# Patient Record
Sex: FEMALE | Race: OTHER | Employment: FULL TIME | URBAN - METROPOLITAN AREA
[De-identification: names, ages, dates, MRNs, and addresses within clinical notes are randomized per-mention and may not be internally consistent; named-entity substitution may affect disease eponyms.]

---

## 2019-03-13 ENCOUNTER — OFFICE VISIT (OUTPATIENT)
Dept: OBGYN CLINIC | Age: 39
End: 2019-03-13

## 2019-03-13 VITALS — SYSTOLIC BLOOD PRESSURE: 120 MMHG | HEART RATE: 77 BPM | WEIGHT: 195.4 LBS | DIASTOLIC BLOOD PRESSURE: 77 MMHG

## 2019-03-13 DIAGNOSIS — O20.0 THREATENED ABORTION: Primary | ICD-10-CM

## 2019-03-13 NOTE — PROGRESS NOTES
Threatened AB note    Key Montgomery is a No obstetric history on file. ,  41 y.o. female OTHER LMP 2018   making her 12 weeks pregnant. She has not had prenatal care. She presents with no spotting and cramping. She has not passed tissue. She had a positive pregnancy test 19. She has had a recent pelvic ultrasound that showed a single nonviable with no cardiac rhythm at 7 weeks. Her past medical history is not significant for risk factors for ectopic pregnancy. She has not had pelvic pain. The patient specifically denies right or left pelvic pain. She does have a history of a spontaneous . ? At 5 months at [facility].  She has not had a recent injury or trauma. Additional complaints: none. History reviewed. No pertinent past medical history. History reviewed. No pertinent surgical history. Social History     Occupational History    Not on file   Tobacco Use    Smoking status: Never Smoker    Smokeless tobacco: Never Used   Substance and Sexual Activity    Alcohol use: No     Frequency: Never    Drug use: No    Sexual activity: Yes     Partners: Male     Birth control/protection: None     History reviewed. No pertinent family history.     No Known Allergies  Prior to Admission medications    Not on File        Review of Systems: History obtained from the patient  Constitutional: negative for weight loss, fever, night sweats  Breast: negative for breast lumps, nipple discharge, galactorrhea  GI: negative for change in bowel habits, abdominal pain, black or bloody stools  : negative for frequency, dysuria, hematuria, vaginal discharge  MSK: negative for back pain, joint pain, muscle pain  Skin: negative for itching, rash, hives  Psych: negative for anxiety, depression, change in mood      Objective:  Visit Vitals  /77 (BP 1 Location: Left arm, BP Patient Position: Sitting)   Pulse 77   Wt 195 lb 6.4 oz (88.6 kg)       Physical Exam:   PHYSICAL EXAMINATION    Constitutional  · Appearance: well-nourished, well developed, alert, in no acute distress    ·     Neurologic/Psychiatric  · Mental Status:  · Orientation: grossly oriented to person, place and time  · Mood and Affect: mood normal, affect appropriate    Assessment:   Missed  AB at 7 weeks. Plan:   Offered observation, Cytotec, D&E. She will RTO in 2 weeks for f/u US. Given bleeding precautions. Will try to get records from [facility] about her last pregnancy loss.

## 2019-03-27 ENCOUNTER — OFFICE VISIT (OUTPATIENT)
Dept: OBGYN CLINIC | Age: 39
End: 2019-03-27

## 2019-03-27 DIAGNOSIS — O02.1 MISSED ABORTION: Primary | ICD-10-CM

## 2019-03-27 NOTE — PROGRESS NOTES
Threatened AB note    Read Michael is a ,  41 y.o. female OTHER whose  14 weeks pregnant , who presents for a folow up ultrasound to determine viability. Recent Tests:  She had a positive   pregnancy test weeks ago . She had a pelvic ultrasound today that showed   TA ULTRASOUND PERFORMED  A SINGLE NONVIABLE 6W4D IUP IS SEEN WITH NO CARDIAC RHYTHM. THE GS IS IRREGULAR. NO INTERVAL GROWTH IS SEEN FROM PRIOR US. GESTATIONAL AGE BASED ON TODAYS ULTRASOUND. NO YOLK SAC IS SEEN. RIGHT OVARY APPEARS WITHIN NORMAL LIMITS. LEFT OVARY APPEARS WITHIN NORMAL LIMITS. NO FREE FLUID SEEN IN THE CDS. Silvino Cronin She has not had prenatal care. Sheis not having pelvic pain. She (has not passed tissue. The patient denies lateralizing pelvic pain. She does have a history of a spontaneous . Her past medical history is significant for  has no past medical history on file. .   She has not had a recent injury or trauma. Additional complaints: none. No past medical history on file. No past surgical history on file. Social History     Occupational History    Not on file   Tobacco Use    Smoking status: Never Smoker    Smokeless tobacco: Never Used   Substance and Sexual Activity    Alcohol use: No     Frequency: Never    Drug use: No    Sexual activity: Yes     Partners: Male     Birth control/protection: None     No family history on file.     No Known Allergies  Prior to Admission medications    Not on File        Review of Systems: History obtained from the patient  Constitutional: negative for weight loss, fever, night sweats  Breast: negative for breast lumps, nipple discharge, galactorrhea  GI: negative for change in bowel habits, abdominal pain, black or bloody stools  : negative for frequency, dysuria, hematuria, vaginal discharge  MSK: negative for back pain, joint pain, muscle pain  Skin: negative for itching, rash, hives  Psych: negative for anxiety, depression, change in mood      Objective: There were no vitals taken for this visit. Physical Exam:   PHYSICAL EXAMINATION    Constitutional  · Appearance: well-nourished, well developed, alert, in no acute distress    Gastrointestinal  · Abdominal Examination: abdomen non-tender to palpation, normal bowel sounds, no masses present  · Liver and spleen: no hepatomegaly present, spleen not palpable  · Hernias: no hernias identified    Genitourinary  · External Genitalia: normal appearance for age, no discharge present, no tenderness present, no inflammatory lesions present, no masses present, no atrophy present  · Vagina: normal vaginal vault without central or paravaginal defects, no discharge present, no inflammatory lesions present, no masses present  · Bladder: non-tender to palpation  · Urethra: appears normal  · Cervix: normal   · Uterus: enlarged, soft, mildly tender with normal shape and consistency  · Adnexa: no adnexal tenderness present, no adnexal masses present  · Perineum: perineum within normal limits, no evidence of trauma, no rashes or skin lesions present  · Anus: anus within normal limits, no hemorrhoids present  · Inguinal Lymph Nodes: no lymphadenopathy present    Skin  · General Inspection: no rash, no lesions identified    Neurologic/Psychiatric  · Mental Status:  · Orientation: grossly oriented to person, place and time  · Mood and Affect: mood normal, affect appropriate    Assessment:   Missed     Plan:   Given options of observation, cytotec and D&C. She desires D&C. Aware of risks and alternatives. Her questions were answered.

## 2019-03-28 ENCOUNTER — ANESTHESIA EVENT (OUTPATIENT)
Dept: SURGERY | Age: 39
End: 2019-03-28
Payer: SELF-PAY

## 2019-03-28 RX ORDER — ONDANSETRON 2 MG/ML
4 INJECTION INTRAMUSCULAR; INTRAVENOUS AS NEEDED
Status: CANCELLED | OUTPATIENT
Start: 2019-03-28

## 2019-03-28 RX ORDER — SODIUM CHLORIDE, SODIUM LACTATE, POTASSIUM CHLORIDE, CALCIUM CHLORIDE 600; 310; 30; 20 MG/100ML; MG/100ML; MG/100ML; MG/100ML
125 INJECTION, SOLUTION INTRAVENOUS CONTINUOUS
Status: CANCELLED | OUTPATIENT
Start: 2019-03-28

## 2019-03-28 RX ORDER — HYDROMORPHONE HYDROCHLORIDE 1 MG/ML
.5-1 INJECTION, SOLUTION INTRAMUSCULAR; INTRAVENOUS; SUBCUTANEOUS
Status: CANCELLED | OUTPATIENT
Start: 2019-03-28

## 2019-03-28 NOTE — H&P
Seymour Acevedo [facility] 81 HISTORY AND PHYSICAL Name:  Yeni Tariq 
MR#:  [id number] :  1980 ACCOUNT #:  [de-identified] ADMIT DATE:  2019 She is to be admitted on  for outpatient surgery. PREOPERATIVE DIAGNOSIS:  Missed . HISTORY OF PRESENT ILLNESS:  The patient is a 51-year-old female, G5, P2, A2, who has been seen in the office over the past 2 weeks with serial ultrasounds. Ultrasounds have shown no growth. She should be about 14 weeks by her dates. Ultrasound shows a 6-1/2-week intrauterine pregnancy with no fetal flicker. This is consistent with a similar ultrasound two weeks ago. There has been no change in the ultrasound in the past two weeks. She has had no bleeding or cramping. She had a spontaneous  about six months ago and had a D and C at that time. She elected to proceed with D and C this time. She was given options of observation versus medical treatment. PAST MEDICAL HISTORY:  Surgeries:  D and C x2. She has had some sort of back treatment as well. Illnesses:  None. ALLERGIES:  NONE. CURRENT MEDICATIONS:  Multivitamins. SOCIAL HISTORY:  Negative for tobacco or alcohol. PHYSICAL EXAMINATION: 
VITAL SIGNS:  Blood pressure is 120/77, weight is 195, pulse is 77. GENERAL:  The patient appears well developed, well nourished. She is in no acute distress. CHEST:  Clear. HEART:  Regular rate and rhythm. ABDOMEN:  Soft and nontender. PELVIC:  External genitalia is normal.  Vagina is normal.  Cervix is normal.  Uterus is about 6 to 8 weeks' size. Adnexae are unremarkable. IMPRESSION:  Missed  at 6 to 7 weeks. PLAN:  Admission as an outpatient for dilation and evacuation. Again, the patient was given options of observation versus medical treatment and she desires D and C. She was advised of the risk of D and C and the alternatives and all her questions were answered.  
 
 
Nora Phan MD 
 
 
 [id number]/ 
D:  03/28/2019 8:12 
T:  03/28/2019 8:27 JOB #:  [id number]

## 2019-03-28 NOTE — PERIOP NOTES
Received a return e-mail from [facility] [address] [place] [address] to be  for the day of surgery.   DOS: 3/29/2019

## 2019-03-29 ENCOUNTER — HOSPITAL ENCOUNTER (OUTPATIENT)
Age: 39
Setting detail: OUTPATIENT SURGERY
Discharge: HOME | End: 2019-03-29
Attending: OBSTETRICS & GYNECOLOGY | Admitting: OBSTETRICS & GYNECOLOGY
Payer: SELF-PAY

## 2019-03-29 ENCOUNTER — ANESTHESIA (OUTPATIENT)
Dept: SURGERY | Age: 39
End: 2019-03-29
Payer: SELF-PAY

## 2019-03-29 VITALS
SYSTOLIC BLOOD PRESSURE: 116 MMHG | DIASTOLIC BLOOD PRESSURE: 71 MMHG | TEMPERATURE: 98.2 F | RESPIRATION RATE: 17 BRPM | HEART RATE: 85 BPM | BODY MASS INDEX: 35.94 KG/M2 | WEIGHT: 195.33 LBS | HEIGHT: 62 IN | OXYGEN SATURATION: 98 %

## 2019-03-29 DIAGNOSIS — G89.18 POST-OPERATIVE PAIN: Primary | ICD-10-CM

## 2019-03-29 DIAGNOSIS — O02.1 MISSED ABORTION: ICD-10-CM

## 2019-03-29 LAB
ABO + RH BLD: NORMAL
BLOOD BANK CMNT PATIENT-IMP: NORMAL
ERYTHROCYTE [DISTWIDTH] IN BLOOD BY AUTOMATED COUNT: 12 % (ref 11.5–14.5)
HCT VFR BLD AUTO: 38.7 % (ref 35–47)
HGB BLD-MCNC: 13 G/DL (ref 11.5–16)
MCH RBC QN AUTO: 30.2 PG (ref 26–34)
MCHC RBC AUTO-ENTMCNC: 33.6 G/DL (ref 30–36.5)
MCV RBC AUTO: 90 FL (ref 80–99)
NRBC # BLD: 0 K/UL (ref 0–0.01)
NRBC BLD-RTO: 0 PER 100 WBC
PLATELET # BLD AUTO: 215 K/UL (ref 150–400)
PMV BLD AUTO: 9.9 FL (ref 8.9–12.9)
RBC # BLD AUTO: 4.3 M/UL (ref 3.8–5.2)
WBC # BLD AUTO: 8 K/UL (ref 3.6–11)

## 2019-03-29 PROCEDURE — 77030018836 HC SOL IRR NACL ICUM -A: Performed by: OBSTETRICS & GYNECOLOGY

## 2019-03-29 PROCEDURE — 74011250636 HC RX REV CODE- 250/636: Performed by: ANESTHESIOLOGY

## 2019-03-29 PROCEDURE — 74011250637 HC RX REV CODE- 250/637: Performed by: OBSTETRICS & GYNECOLOGY

## 2019-03-29 PROCEDURE — 76060000032 HC ANESTHESIA 0.5 TO 1 HR: Performed by: OBSTETRICS & GYNECOLOGY

## 2019-03-29 PROCEDURE — 74011250636 HC RX REV CODE- 250/636: Performed by: OBSTETRICS & GYNECOLOGY

## 2019-03-29 PROCEDURE — 76210000006 HC OR PH I REC 0.5 TO 1 HR: Performed by: OBSTETRICS & GYNECOLOGY

## 2019-03-29 PROCEDURE — 36415 COLL VENOUS BLD VENIPUNCTURE: CPT

## 2019-03-29 PROCEDURE — 74011250636 HC RX REV CODE- 250/636

## 2019-03-29 PROCEDURE — 77030020143 HC AIRWY LARYN INTUB CGAS -A: Performed by: ANESTHESIOLOGY

## 2019-03-29 PROCEDURE — 76210000021 HC REC RM PH II 0.5 TO 1 HR: Performed by: OBSTETRICS & GYNECOLOGY

## 2019-03-29 PROCEDURE — 74011000250 HC RX REV CODE- 250: Performed by: OBSTETRICS & GYNECOLOGY

## 2019-03-29 PROCEDURE — 76010000138 HC OR TIME 0.5 TO 1 HR: Performed by: OBSTETRICS & GYNECOLOGY

## 2019-03-29 PROCEDURE — 77030005537 HC CATH URETH BARD -A: Performed by: OBSTETRICS & GYNECOLOGY

## 2019-03-29 PROCEDURE — 77030008578 HC TBNG UTER SUC BUSS -A: Performed by: OBSTETRICS & GYNECOLOGY

## 2019-03-29 PROCEDURE — 77030020782 HC GWN BAIR PAWS FLX 3M -B

## 2019-03-29 PROCEDURE — 88305 TISSUE EXAM BY PATHOLOGIST: CPT

## 2019-03-29 PROCEDURE — 85027 COMPLETE CBC AUTOMATED: CPT

## 2019-03-29 PROCEDURE — 77030032490 HC SLV COMPR SCD KNE COVD -B

## 2019-03-29 PROCEDURE — 86900 BLOOD TYPING SEROLOGIC ABO: CPT

## 2019-03-29 RX ORDER — SODIUM CHLORIDE 0.9 % (FLUSH) 0.9 %
5-40 SYRINGE (ML) INJECTION EVERY 8 HOURS
Status: DISCONTINUED | OUTPATIENT
Start: 2019-03-29 | End: 2019-03-29 | Stop reason: HOSPADM

## 2019-03-29 RX ORDER — LIDOCAINE HYDROCHLORIDE 10 MG/ML
0.1 INJECTION, SOLUTION EPIDURAL; INFILTRATION; INTRACAUDAL; PERINEURAL AS NEEDED
Status: DISCONTINUED | OUTPATIENT
Start: 2019-03-29 | End: 2019-03-29 | Stop reason: HOSPADM

## 2019-03-29 RX ORDER — SODIUM CHLORIDE, SODIUM LACTATE, POTASSIUM CHLORIDE, CALCIUM CHLORIDE 600; 310; 30; 20 MG/100ML; MG/100ML; MG/100ML; MG/100ML
125 INJECTION, SOLUTION INTRAVENOUS CONTINUOUS
Status: DISCONTINUED | OUTPATIENT
Start: 2019-03-29 | End: 2019-03-29 | Stop reason: HOSPADM

## 2019-03-29 RX ORDER — SODIUM CHLORIDE 0.9 % (FLUSH) 0.9 %
5-40 SYRINGE (ML) INJECTION AS NEEDED
Status: DISCONTINUED | OUTPATIENT
Start: 2019-03-29 | End: 2019-03-29 | Stop reason: HOSPADM

## 2019-03-29 RX ORDER — SILVER NITRATE 38.21; 12.74 MG/1; MG/1
STICK TOPICAL AS NEEDED
Status: DISCONTINUED | OUTPATIENT
Start: 2019-03-29 | End: 2019-03-29 | Stop reason: HOSPADM

## 2019-03-29 RX ORDER — PROPOFOL 10 MG/ML
INJECTION, EMULSION INTRAVENOUS AS NEEDED
Status: DISCONTINUED | OUTPATIENT
Start: 2019-03-29 | End: 2019-03-29 | Stop reason: HOSPADM

## 2019-03-29 RX ORDER — LIDOCAINE HYDROCHLORIDE 20 MG/ML
INJECTION, SOLUTION EPIDURAL; INFILTRATION; INTRACAUDAL; PERINEURAL AS NEEDED
Status: DISCONTINUED | OUTPATIENT
Start: 2019-03-29 | End: 2019-03-29 | Stop reason: HOSPADM

## 2019-03-29 RX ORDER — KETOROLAC TROMETHAMINE 30 MG/ML
INJECTION, SOLUTION INTRAMUSCULAR; INTRAVENOUS AS NEEDED
Status: DISCONTINUED | OUTPATIENT
Start: 2019-03-29 | End: 2019-03-29 | Stop reason: HOSPADM

## 2019-03-29 RX ORDER — HYDROCODONE BITARTRATE AND ACETAMINOPHEN 7.5; 325 MG/1; MG/1
1 TABLET ORAL
Qty: 10 TAB | Refills: 0 | Status: SHIPPED | OUTPATIENT
Start: 2019-03-29 | End: 2019-04-01

## 2019-03-29 RX ORDER — NALOXONE HYDROCHLORIDE 0.4 MG/ML
0.04 INJECTION, SOLUTION INTRAMUSCULAR; INTRAVENOUS; SUBCUTANEOUS
Status: DISCONTINUED | OUTPATIENT
Start: 2019-03-29 | End: 2019-03-29 | Stop reason: HOSPADM

## 2019-03-29 RX ORDER — MIDAZOLAM HYDROCHLORIDE 1 MG/ML
INJECTION, SOLUTION INTRAMUSCULAR; INTRAVENOUS AS NEEDED
Status: DISCONTINUED | OUTPATIENT
Start: 2019-03-29 | End: 2019-03-29 | Stop reason: HOSPADM

## 2019-03-29 RX ORDER — HYDROCODONE BITARTRATE AND ACETAMINOPHEN 7.5; 325 MG/1; MG/1
1 TABLET ORAL
Status: COMPLETED | OUTPATIENT
Start: 2019-03-29 | End: 2019-03-29

## 2019-03-29 RX ORDER — DIPHENHYDRAMINE HYDROCHLORIDE 50 MG/ML
12.5 INJECTION, SOLUTION INTRAMUSCULAR; INTRAVENOUS AS NEEDED
Status: DISCONTINUED | OUTPATIENT
Start: 2019-03-29 | End: 2019-03-29 | Stop reason: HOSPADM

## 2019-03-29 RX ORDER — CEFAZOLIN SODIUM/WATER 2 G/20 ML
2 SYRINGE (ML) INTRAVENOUS ONCE
Status: COMPLETED | OUTPATIENT
Start: 2019-03-29 | End: 2019-03-29

## 2019-03-29 RX ORDER — CEFAZOLIN SODIUM 1 G/3ML
2 INJECTION, POWDER, FOR SOLUTION INTRAMUSCULAR; INTRAVENOUS ONCE
Status: DISCONTINUED | OUTPATIENT
Start: 2019-03-29 | End: 2019-03-29

## 2019-03-29 RX ORDER — FLUMAZENIL 0.1 MG/ML
0.2 INJECTION INTRAVENOUS
Status: DISCONTINUED | OUTPATIENT
Start: 2019-03-29 | End: 2019-03-29 | Stop reason: HOSPADM

## 2019-03-29 RX ORDER — ONDANSETRON 2 MG/ML
INJECTION INTRAMUSCULAR; INTRAVENOUS AS NEEDED
Status: DISCONTINUED | OUTPATIENT
Start: 2019-03-29 | End: 2019-03-29 | Stop reason: HOSPADM

## 2019-03-29 RX ORDER — DEXAMETHASONE SODIUM PHOSPHATE 4 MG/ML
INJECTION, SOLUTION INTRA-ARTICULAR; INTRALESIONAL; INTRAMUSCULAR; INTRAVENOUS; SOFT TISSUE AS NEEDED
Status: DISCONTINUED | OUTPATIENT
Start: 2019-03-29 | End: 2019-03-29 | Stop reason: HOSPADM

## 2019-03-29 RX ORDER — FENTANYL CITRATE 50 UG/ML
INJECTION, SOLUTION INTRAMUSCULAR; INTRAVENOUS AS NEEDED
Status: DISCONTINUED | OUTPATIENT
Start: 2019-03-29 | End: 2019-03-29 | Stop reason: HOSPADM

## 2019-03-29 RX ORDER — METHYLERGONOVINE MALEATE 0.2 MG/ML
INJECTION INTRAVENOUS AS NEEDED
Status: DISCONTINUED | OUTPATIENT
Start: 2019-03-29 | End: 2019-03-29 | Stop reason: HOSPADM

## 2019-03-29 RX ORDER — IBUPROFEN 600 MG/1
600 TABLET ORAL
Qty: 30 TAB | Refills: 1 | Status: SHIPPED | OUTPATIENT
Start: 2019-03-29 | End: 2022-01-31

## 2019-03-29 RX ORDER — HYDROMORPHONE HYDROCHLORIDE 1 MG/ML
.25-1 INJECTION, SOLUTION INTRAMUSCULAR; INTRAVENOUS; SUBCUTANEOUS
Status: DISCONTINUED | OUTPATIENT
Start: 2019-03-29 | End: 2019-03-29 | Stop reason: HOSPADM

## 2019-03-29 RX ADMIN — SODIUM CHLORIDE, SODIUM LACTATE, POTASSIUM CHLORIDE, AND CALCIUM CHLORIDE: 600; 310; 30; 20 INJECTION, SOLUTION INTRAVENOUS at 09:10

## 2019-03-29 RX ADMIN — FENTANYL CITRATE 100 MCG: 50 INJECTION, SOLUTION INTRAMUSCULAR; INTRAVENOUS at 09:17

## 2019-03-29 RX ADMIN — FENTANYL CITRATE 50 MCG: 50 INJECTION, SOLUTION INTRAMUSCULAR; INTRAVENOUS at 09:10

## 2019-03-29 RX ADMIN — HYDROCODONE BITARTRATE AND ACETAMINOPHEN 1 TABLET: 7.5; 325 TABLET ORAL at 11:21

## 2019-03-29 RX ADMIN — PROPOFOL 150 MG: 10 INJECTION, EMULSION INTRAVENOUS at 09:17

## 2019-03-29 RX ADMIN — METHYLERGONOVINE MALEATE 0.2 MG: 0.2 INJECTION INTRAVENOUS at 09:31

## 2019-03-29 RX ADMIN — ONDANSETRON 4 MG: 2 INJECTION INTRAMUSCULAR; INTRAVENOUS at 09:38

## 2019-03-29 RX ADMIN — DEXAMETHASONE SODIUM PHOSPHATE 4 MG: 4 INJECTION, SOLUTION INTRA-ARTICULAR; INTRALESIONAL; INTRAMUSCULAR; INTRAVENOUS; SOFT TISSUE at 09:35

## 2019-03-29 RX ADMIN — FENTANYL CITRATE 25 MCG: 50 INJECTION, SOLUTION INTRAMUSCULAR; INTRAVENOUS at 09:27

## 2019-03-29 RX ADMIN — SODIUM CHLORIDE, SODIUM LACTATE, POTASSIUM CHLORIDE, AND CALCIUM CHLORIDE 125 ML/HR: 600; 310; 30; 20 INJECTION, SOLUTION INTRAVENOUS at 08:00

## 2019-03-29 RX ADMIN — LIDOCAINE HYDROCHLORIDE 40 MG: 20 INJECTION, SOLUTION EPIDURAL; INFILTRATION; INTRACAUDAL; PERINEURAL at 09:17

## 2019-03-29 RX ADMIN — Medication 2 G: at 09:27

## 2019-03-29 RX ADMIN — MIDAZOLAM HYDROCHLORIDE 2 MG: 1 INJECTION, SOLUTION INTRAMUSCULAR; INTRAVENOUS at 09:17

## 2019-03-29 RX ADMIN — KETOROLAC TROMETHAMINE 30 MG: 30 INJECTION, SOLUTION INTRAMUSCULAR; INTRAVENOUS at 09:47

## 2019-03-29 NOTE — ANESTHESIA POSTPROCEDURE EVALUATION
Procedure(s):  DILATATION AND CURETTAGE WITH SUCTION. general    Anesthesia Post Evaluation      Multimodal analgesia: multimodal analgesia not used between 6 hours prior to anesthesia start to PACU discharge  Patient location during evaluation: PACU  Patient participation: complete - patient participated  Level of consciousness: awake  Pain management: adequate  Airway patency: patent  Anesthetic complications: no  Cardiovascular status: acceptable, blood pressure returned to baseline and hemodynamically stable  Respiratory status: acceptable  Hydration status: acceptable        Vitals Value Taken Time   /64 3/29/2019 10:20 AM   Temp 36.5 °C (97.7 °F) 3/29/2019  9:54 AM   Pulse 91 3/29/2019 10:25 AM   Resp 23 3/29/2019 10:25 AM   SpO2 98 % 3/29/2019 10:25 AM   Vitals shown include unvalidated device data.

## 2019-03-29 NOTE — DISCHARGE INSTRUCTIONS
Patient Education        Aspiración endouterina: Instrucciones de cuidado - [ Vacuum Aspiration: Care Instructions ]  Instrucciones de cuidado  La aspiración endouterina puede usarse para vaciar el útero después de un aborto incompleto u otra pérdida fetal. También se llama dilatación y Randene Adi o dilatación y evacuación. Muchos abortos espontáneos se completan solos, braydon algunos no. Estos se llaman abortos espontáneos incompletos. [place] significa que no se desprende todo el tejido del [place]. Antes del procedimiento, puede colocarse un dispositivo en el sergio uterino para abrirlo (dilatarlo) lentamente. Podrían haberle hecho stephanie aspiración endouterina manual o mecánica. Con la aspiración manual, el médico [place] stephanie jeringa especialmente diseñada para aplicar succión. Con la aspiración mecánica, se conecta un tubo castellanos a un frasco y a stephanie bomba. El tubo se introduce en el útero. La bomba proporciona stephanie succión suave para extraer el tejido. Después del procedimiento, sherrie vez tenga sangrado y [place]. Sherrie vez también tenga cólicos que se sienten jayesh cólicos menstruales. Culpa, ansiedad y Alina John son reacciones comunes después de un aborto espontáneo. También es común querer saber el motivo por el cual ocurrió un aborto espontáneo. Los cambios hormonales rony el embarazo pueden intensificar las emociones. Estos sentimientos pueden prolongarse por un tiempo. La atención de seguimiento es stephanie parte clave de watson tratamiento y seguridad. Asegúrese de hacer y acudir a todas las citas, y llame a watson médico si está teniendo problemas. También es stephanie buena idea saber los resultados de linda exámenes y mantener stephanie lista de los medicamentos que agnes. ¿Cómo puede cuidarse en el hogar? · Si el médico le recetó antibióticos, tómelos según las indicaciones. No deje de tomarlos solo porque se sienta mejor. Debe aaron todos los antibióticos hasta terminarlos. · Descanse tranquila por el madison del día.  Puede hacer [facility] normales al día siguiente, según cómo se sienta. · Pregúntele a watson médico si puede aaron un analgésico (medicamento para el dolor) de venta luther, jayesh acetaminofén (Tylenol), ibuprofeno (Advil, Motrin) o naproxeno (Aleve). [place] con los medicamentos. Remedios y siga todas las instrucciones de la [place]. · Use toallas sanitarias en lugar de tampones. Es normal tener sangrado vaginal leve o moderado rony 1 a 2 semanas. Podría ser similar o un poco más intenso que el período menstrual habitual. Debería tener menos sangrado después de [place]. · Pregúntele a watson médico cuándo puede tener [employer]. Si usted no quiere [facility], consulte a watson médico sobre métodos anticonceptivos. Puede quedar embarazada otra vez antes de que comience watson próximo período si no [place] un método anticonceptivo. Si planea volver a quedar embarazada, hable con watson médico.  · Para ayudar a que usted y watson landy sobrelleven watson pérdida, considere reunirse con un josh de apoyo. Damien vez también quiera leer [facility] experiencias de [place]. O puede hablar con amigos, un consejero o un miembro del clero. Si usted se siente muy dean o deprimida rony más de un par de semanas, hable con un consejero o con watson médico.  ¿Cuándo debe pedir ayuda? Llame a watson médico ahora mismo o busque atención médica inmediata si:    · Tiene sangrado vaginal intenso. Erick significa que usted empapa linda toallas sanitarias habituales cada hora rony 2 horas o más.     · Se siente mareada o aturdida, o siente que podría desmayarse.     · Tiene nuevo o mayor dolor en el abdomen o en la pelvis.     · Tiene fiebre.     · Se siente deprimida o no puede desempeñarse jayesh de costumbre.    Preste especial atención a los cambios en watson candy y asegúrese de comunicarse con watson médico si:    · Watson sangrado vaginal está empeorando.     · Tiene algún síntoma nuevo.     · Tiene flujo vaginal con olor desagradable.     · No mejora jayesh se esperaba. ¿Dónde puede encontrar más información en inglés? Kelvin Deluna a http://arlen-joann.info/. Nicho Walter X438 en la búsqueda para aprender más acerca de \"Aspiración endouterina: Instrucciones de cuidado - [ Vacuum Aspiration: Care Instructions ]. \"  Revisado: 5 septiembre, 2018  Versión del contenido: 11.9  © 7429-9339 Healthwise, Incorporated. Las instrucciones de cuidado fueron adaptadas bajo licencia por [employer] (which disclaims liability or warranty for this information). Si usted tiene [place] afección médica o sobre estas instrucciones, siempre pregunte a watson profesional de candy. Healthwise, Incorporated niega toda garantía o responsabilidad por watson uso de esta información. Patient Education     DISCHARGE SUMMARY from your Nurse    The following personal items collected during your admission are returned to you:   Dental Appliance: Dental Appliances: None  Vision: Visual Aid: None  Hearing Aid:    Jewelry: Jewelry: Earrings(x2 right ear, unable to remove)  Clothing: Clothing: Other (comment)(street clothes to locker)  Other Valuables: Other Valuables: None  Valuables sent to safe: Personal Items Sent to Safe: none    PATIENT INSTRUCTIONS:    After general anesthesia or intravenous sedation, for 24 hours or while taking prescription Narcotics:  · Limit your activities  · Do not drive and operate hazardous machinery  · Do not make important personal or business decisions  · Do  not drink alcoholic beverages  · If you have not urinated within 8 hours after discharge, please contact your surgeon on call.     Report the following to your surgeon:  · Excessive pain, swelling, redness or odor of or around the surgical area  · Temperature over 100.5  · Nausea and vomiting lasting longer than 4 hours or if unable to take medications  · Any signs of decreased circulation or nerve impairment to extremity: change in color, persistent  numbness, tingling, coldness or increase pain  · Any questions    COUGH AND DEEP BREATHE    Breathing deep and coughing are very important exercises to do after surgery. Deep breathing and coughing open the little air tubes and air sacks in your lungs. You take deep breaths every day. You may not even notice - it is just something you do when you sigh or yawn. It is a natural exercise you do to keep these air passages open. After surgery, take deep breaths and cough, on purpose. Coughing and deep breathing help prevent bronchitis and pneumonia after surgery. If you had chest or belly surgery, use a pillow as a \"hug julius\" and hold it tightly to your chest or belly when you cough. DIRECTIONS:  6. Take 10 to 15 slow deep breaths every hour while awake. 7. Breathe in deeply, and hold it for 2 seconds. 8. Exhale slowly through puckered lips, like blowing up a balloon. 9. After every 4th or 5th deep breath, hug your pillow to your chest or belly and give a hard, deep cough. Yes, it will probably hurt. But doing this exercise is very important part of healing after surgery. Take your pain medicine to help you do this exercise without too much pain. IF YOU HAVE BEEN DIAGNOSED WITH SLEEP APNEA, PLEASE USE YOUR SLEEP APNEA DEVICE OR CPAP MACHINE WHEN YOU INTEND TO NAP AFTER TAKING PAIN MEDICATION. Ankle Pumps    Ankle pumps increase the circulation of oxygenated blood to your lower extremities and decrease your risk for circulation problems such as blood clots. They also stretch the muscles, tendons and ligaments in your foot and ankle, and prevent joint contracture in the ankle and foot, especially after surgeries on the legs. It is important to do ankle pump exercises regularly after surgery because immobility increases your risk for developing a blood clot. Your doctor may also have you take an Aspirin for the next few days as well.     If your doctor did not ask you to take an Aspirin, consult with him before starting Aspirin therapy on your own. Slowly point your foot forward, feeling the muscles on the top of your lower leg stretch, and hold this position for 5 seconds. Next, pull your foot back toward you as far as possible, stretching the calf muscles, and hold that position for 5 seconds. Repeat with the other foot. Perform 10 repetitions every hour while awake for both ankles if possible (down and then up with the foot once is one repetition). You should feel gentle stretching of the muscles in your lower leg when doing this exercise. If you feel pain, or your range of motion is limited, don't  Push too hard. Only go the limit your joint and muscles will let you go. If you have increasing pain, progressively worsening leg warmth or swelling, STOP the exercise and call your doctor. Below is information about the medications your doctor is prescribing after your visit:    Other information in your discharge envelope:  []     PRESCRIPTIONS  []     PHYSICAL THERAPY PRESCRIPTION  []     APPOINTMENT CARDS  []     Regional Anesthesia Pamphlet for block or block with On-Q Catheter from Anesthesia Service  []     Medical device information sheets/pamphlets from their    []     School/work excuse note. []     /parent work excuse note. These are general instructions for a healthy lifestyle:    *  Please give a list of your current medications to your Primary Care Provider. *  Please update this list whenever your medications are discontinued, doses are      changed, or new medications (including over-the-counter products) are added. *  Please carry medication information at all times in case of emergency situations. About Smoking  No smoking / No tobacco products / Avoid exposure to second hand smoke    Surgeon General's Warning:  Quitting smoking now greatly reduces serious risk to your health.     Obesity, smoking, and sedentary lifestyle greatly increases your risk for illness and disease. A healthy diet, regular physical exercise & weight monitoring are important for maintaining a healthy lifestyle. Congestive Heart Failure  You may be retaining fluid if you have a history of heart failure or if you experience any of the following symptoms:  Weight gain of 3 pounds or more overnight or 5 pounds in a week, increased swelling in our hands or feet or shortness of breath while lying flat in bed. Please call your doctor as soon as you notice any of these symptoms; do not wait until your next office visit. Recognize signs and symptoms of STROKE:  F - face looks uneven  A - arms unable to move or move even  S - speech slurred or non-existent  T - time-call 911 as soon as signs and symptoms begin-DO NOT go         Back to bed or wait to see if you get better-TIME IS BRAIN. Warning signs of HEART ATTACK  Call 911 if you have these symptoms    · Chest discomfort. Most heart attacks involve discomfort in the center of the chest that lasts more than a few minutes, or that goes away and comes back. It can feel like uncomfortable pressure, squeezing, fullness, or pain. · Discomfort in other areas of the upper body. Symptoms can include pain or discomfort in one or both        Arms, the back, neck, jaw, or stomach. ·  Shortness of breath with or without chest discomfort. · Other signs may include breaking out in a cold sweat, nausea, or lightheadedness    Don't wait more than five minutes to call 911 - MINUTES MATTER! Fast action can save your life. Calling 911 is almost always the fastest way to get lifesaving treatment. Emergency Medical Services staff can begin treatment when they arrive - up to an hour sooner than if someone gets to the hospital by car. BRANT STRAUSS MEDICATION AND SIDE EFFECT GUIDE    The [facility] MEDICATION AND SIDE EFFECT GUIDE was provided to the PATIENT AND CARE PROVIDER.   Information provided includes instruction about drug purpose and common side effects for the following medications:    · norco

## 2019-03-29 NOTE — ANESTHESIA PREPROCEDURE EVALUATION
Relevant Problems   No relevant active problems       Anesthetic History   No history of anesthetic complications            Review of Systems / Medical History  Patient summary reviewed and pertinent labs reviewed    Pulmonary  Within defined limits                 Neuro/Psych   Within defined limits           Cardiovascular  Within defined limits                Exercise tolerance: >4 METS     GI/Hepatic/Renal  Within defined limits              Endo/Other  Within defined limits           Other Findings              Physical Exam    Airway  Mallampati: IV  TM Distance: 4 - 6 cm  Neck ROM: normal range of motion   Mouth opening: Diminished (comment)     Cardiovascular    Rhythm: regular  Rate: normal         Dental    Dentition: Upper dentition intact and Lower dentition intact     Pulmonary  Breath sounds clear to auscultation               Abdominal         Other Findings            Anesthetic Plan    ASA: 1  Anesthesia type: general          Induction: Intravenous  Anesthetic plan and risks discussed with: Patient

## 2019-03-29 NOTE — BRIEF OP NOTE
BRIEF OPERATIVE NOTE    Date of Procedure: 3/29/2019   Preoperative Diagnosis: MISSED AB  Postoperative Diagnosis: MISSED AB    Procedure(s):  DILATATION AND CURETTAGE WITH SUCTION  Surgeon(s) and Role:     Val Aldridge MD - Primary         Surgical Assistant: none    Surgical Staff:  Circ-1: Ajit Wisdom RN  Scrub Tech-1: Sim Pittman   Event Time In Time Out   Incision Start 4537    Incision Close       Anesthesia: General   Estimated Blood Loss: 100 cc  Specimens:   ID Type Source Tests Collected by Time Destination   1 :  [employer]  Ilda Jimenez MD 3/29/2019 3349 Pathology      Findings: POC   Complications: none  Implants: * No implants in log *

## 2019-03-30 NOTE — OP NOTES
Seymour Acevedo [facility] 81  OPERATIVE REPORT    Name:  Linda Francis  MR#:  [id number]  :  1980  ACCOUNT #:  [de-identified]  DATE OF SERVICE:  2019      PREOPERATIVE DIAGNOSIS:  Missed . POSTOPERATIVE DIAGNOSIS:  Missed . PROCEDURE PERFORMED:  Suction, dilation, and evacuation. SURGEON:  Cecilia Redd MD    ASSISTANT:  none    ANESTHESIA:  General.    COMPLICATIONS:  None. SPECIMENS REMOVED:  Pathology with uterine contents. IMPLANTS:  None. ESTIMATED BLOOD LOSS:  100 mL. INDICATIONS:  The patient had serial ultrasounds that show a missed  of about 6-7 weeks. She desires dilation and evacuation. She was offered other management options and declines. FINDINGS:  Products of conception. PROCEDURE:  The patient was taken to the operating room and placed on the table in a supine position. Following adequate general anesthesia, the patient was placed in the dorsal lithotomy position and prepped and draped in sterile fashion. A speculum was placed in the vagina and the cervix was visualized. The anterior lip of the cervix was grasped with a single-tooth tenaculum and was sounded to about 10 sonometers. The cervix was dilated with a #10 Hegar dilator and a #10 curved suction curette was passed into the endocervical canal and attached to the vacuum hose. Uterine contents were vacuumed out with pressures above 60. It appeared to be products of conception. Sharp curettage was carried out and it was felt the uterus was empty. Observation showed some bleeding from the tenaculum site on the cervix. This was controlled with silver nitrate. Observation showed hemostasis from the uterus and from the cervix. The patient did receive Methergine at the beginning of the procedure. The patient was awakened and taken to the recovery room in stable condition. Sponge, needle, and instrument counts were correct. Estimated blood loss was 100 mL.   The patient's blood type is pending at this time.       Tish Mccord MD      [id number]  D:  03/29/2019 9:51  T:  03/29/2019 16:36  JOB #:  [id number]

## 2019-04-12 ENCOUNTER — OFFICE VISIT (OUTPATIENT)
Dept: OBGYN CLINIC | Age: 39
End: 2019-04-12

## 2019-04-12 DIAGNOSIS — Z98.890 STATUS POST DILATATION AND CURETTAGE: ICD-10-CM

## 2019-04-12 DIAGNOSIS — Z09 FOLLOW-UP EXAMINATION, FOLLOWING OTHER SURGERY: Primary | ICD-10-CM

## 2019-04-12 NOTE — PROGRESS NOTES
Postop Evaluation  Celestina Green is a 41 y.o. female returns for a routine post-operative follow-up visit after undergoing the following: D and C  which was done 4 weeks ago. Her pathology results revealed    FINAL PATHOLOGIC DIAGNOSIS   Uterine contents, evacuation:   Products of conception including chorionic villi . Since the patient's surgery, she has had typical postoperative discomfort but no significant symptoms or problems since the surgery. The patient is doing well with no significant bleeding. She states since the procedure, she has returned to full daily activities. PHYSICAL EXAMINATION    Gastrointestinal  · Abdominal Examination: abdomen non-tender to palpation, incision/s healing well, normal bowel sounds, no masses present  · Liver and spleen: no hepatomegaly present, spleen not palpable  · Hernias: no hernias identified    Genitourinary  · External Genitalia: normal appearance for age, no discharge present, no tenderness present, no inflammatory lesions present, no masses present, no atrophy present  · Vagina: normal vaginal vault without central or paravaginal defects, no discharge present, no inflammatory lesions present, no masses present  · Bladder: non-tender to palpation  · Urethra: appears normal  · Cervix: normal   · Uterus: normal size, shape and consistency  · Adnexa: no adnexal tenderness present, no adnexal masses present  · Perineum: perineum within normal limits, no evidence of trauma, no rashes or skin lesions present  Skin  · General Inspection: no rash, no lesions identified    Neurologic/Psychiatric  · Mental Status:  · Orientation: grossly oriented to person, place and time  · Mood and Affect: mood normal, affect appropriate    Assessment:  Normal postop checkup    Plan:  RTO as scheduled for AE.

## 2021-12-20 ENCOUNTER — APPOINTMENT (OUTPATIENT)
Dept: ULTRASOUND IMAGING | Age: 41
End: 2021-12-20
Attending: PHYSICIAN ASSISTANT

## 2021-12-20 ENCOUNTER — HOSPITAL ENCOUNTER (EMERGENCY)
Age: 41
Discharge: HOME | End: 2021-12-20
Attending: EMERGENCY MEDICINE

## 2021-12-20 VITALS
DIASTOLIC BLOOD PRESSURE: 82 MMHG | TEMPERATURE: 98.2 F | HEART RATE: 98 BPM | SYSTOLIC BLOOD PRESSURE: 124 MMHG | RESPIRATION RATE: 18 BRPM | OXYGEN SATURATION: 98 %

## 2021-12-20 DIAGNOSIS — O46.90 VAGINAL BLEEDING IN PREGNANCY: ICD-10-CM

## 2021-12-20 DIAGNOSIS — Z3A.14 14 WEEKS GESTATION OF PREGNANCY: ICD-10-CM

## 2021-12-20 DIAGNOSIS — O20.0 THREATENED MISCARRIAGE: Primary | ICD-10-CM

## 2021-12-20 LAB
ABO + RH BLD: NORMAL
ALBUMIN SERPL-MCNC: 3.1 G/DL (ref 3.5–5)
ALBUMIN/GLOB SERPL: 0.7 {RATIO} (ref 1.1–2.2)
ALP SERPL-CCNC: 72 U/L (ref 45–117)
ALT SERPL-CCNC: 17 U/L (ref 12–78)
ANION GAP SERPL CALC-SCNC: 8 MMOL/L (ref 5–15)
APPEARANCE UR: CLEAR
AST SERPL-CCNC: 14 U/L (ref 15–37)
BACTERIA URNS QL MICRO: NEGATIVE /HPF
BASOPHILS # BLD: 0 K/UL (ref 0–0.1)
BASOPHILS NFR BLD: 0 % (ref 0–1)
BILIRUB SERPL-MCNC: 0.2 MG/DL (ref 0.2–1)
BILIRUB UR QL: NEGATIVE
BLOOD GROUP ANTIBODIES SERPL: NORMAL
BUN SERPL-MCNC: 5 MG/DL (ref 6–20)
BUN/CREAT SERPL: 7 (ref 12–20)
CALCIUM SERPL-MCNC: 8.7 MG/DL (ref 8.5–10.1)
CHLORIDE SERPL-SCNC: 107 MMOL/L (ref 97–108)
CO2 SERPL-SCNC: 22 MMOL/L (ref 21–32)
COLOR UR: NORMAL
CREAT SERPL-MCNC: 0.67 MG/DL (ref 0.55–1.02)
DIFFERENTIAL METHOD BLD: ABNORMAL
EOSINOPHIL # BLD: 0.1 K/UL (ref 0–0.4)
EOSINOPHIL NFR BLD: 1 % (ref 0–7)
EPITH CASTS URNS QL MICRO: NORMAL /LPF
ERYTHROCYTE [DISTWIDTH] IN BLOOD BY AUTOMATED COUNT: 12.6 % (ref 11.5–14.5)
GLOBULIN SER CALC-MCNC: 4.3 G/DL (ref 2–4)
GLUCOSE SERPL-MCNC: 142 MG/DL (ref 65–100)
GLUCOSE UR STRIP.AUTO-MCNC: NEGATIVE MG/DL
HCG SERPL-ACNC: ABNORMAL MIU/ML (ref 0–6)
HCT VFR BLD AUTO: 39.7 % (ref 35–47)
HGB BLD-MCNC: 13.3 G/DL (ref 11.5–16)
HGB UR QL STRIP: NEGATIVE
HYALINE CASTS URNS QL MICRO: NORMAL /LPF (ref 0–5)
IMM GRANULOCYTES # BLD AUTO: 0.1 K/UL (ref 0–0.04)
IMM GRANULOCYTES NFR BLD AUTO: 1 % (ref 0–0.5)
KETONES UR QL STRIP.AUTO: NEGATIVE MG/DL
LEUKOCYTE ESTERASE UR QL STRIP.AUTO: NEGATIVE
LYMPHOCYTES # BLD: 1.9 K/UL (ref 0.8–3.5)
LYMPHOCYTES NFR BLD: 18 % (ref 12–49)
MCH RBC QN AUTO: 29.7 PG (ref 26–34)
MCHC RBC AUTO-ENTMCNC: 33.5 G/DL (ref 30–36.5)
MCV RBC AUTO: 88.6 FL (ref 80–99)
MONOCYTES # BLD: 0.5 K/UL (ref 0–1)
MONOCYTES NFR BLD: 5 % (ref 5–13)
NEUTS SEG # BLD: 8 K/UL (ref 1.8–8)
NEUTS SEG NFR BLD: 75 % (ref 32–75)
NITRITE UR QL STRIP.AUTO: NEGATIVE
NRBC # BLD: 0 K/UL (ref 0–0.01)
NRBC BLD-RTO: 0 PER 100 WBC
PH UR STRIP: 6 [PH] (ref 5–8)
PLATELET # BLD AUTO: 225 K/UL (ref 150–400)
PMV BLD AUTO: 10.6 FL (ref 8.9–12.9)
POTASSIUM SERPL-SCNC: 3.6 MMOL/L (ref 3.5–5.1)
PROT SERPL-MCNC: 7.4 G/DL (ref 6.4–8.2)
PROT UR STRIP-MCNC: NEGATIVE MG/DL
RBC # BLD AUTO: 4.48 M/UL (ref 3.8–5.2)
RBC #/AREA URNS HPF: NORMAL /HPF (ref 0–5)
SODIUM SERPL-SCNC: 137 MMOL/L (ref 136–145)
SP GR UR REFRACTOMETRY: 1.01 (ref 1–1.03)
SPECIMEN EXP DATE BLD: NORMAL
UR CULT HOLD, URHOLD: NORMAL
UROBILINOGEN UR QL STRIP.AUTO: 0.2 EU/DL (ref 0.2–1)
WBC # BLD AUTO: 10.6 K/UL (ref 3.6–11)
WBC URNS QL MICRO: NORMAL /HPF (ref 0–4)

## 2021-12-20 PROCEDURE — 99282 EMERGENCY DEPT VISIT SF MDM: CPT

## 2021-12-20 PROCEDURE — 74011250637 HC RX REV CODE- 250/637: Performed by: PHYSICIAN ASSISTANT

## 2021-12-20 PROCEDURE — 76815 OB US LIMITED FETUS(S): CPT

## 2021-12-20 PROCEDURE — 84702 CHORIONIC GONADOTROPIN TEST: CPT

## 2021-12-20 PROCEDURE — 81001 URINALYSIS AUTO W/SCOPE: CPT

## 2021-12-20 PROCEDURE — 80053 COMPREHEN METABOLIC PANEL: CPT

## 2021-12-20 PROCEDURE — 36415 COLL VENOUS BLD VENIPUNCTURE: CPT

## 2021-12-20 PROCEDURE — 85025 COMPLETE CBC W/AUTO DIFF WBC: CPT

## 2021-12-20 PROCEDURE — 76817 TRANSVAGINAL US OBSTETRIC: CPT

## 2021-12-20 PROCEDURE — 86901 BLOOD TYPING SEROLOGIC RH(D): CPT

## 2021-12-20 RX ORDER — ACETAMINOPHEN 325 MG/1
650 TABLET ORAL
Status: COMPLETED | OUTPATIENT
Start: 2021-12-20 | End: 2021-12-20

## 2021-12-20 RX ADMIN — ACETAMINOPHEN 650 MG: 325 TABLET ORAL at 10:29

## 2021-12-20 NOTE — ED PROVIDER NOTES
Swati Estrella is a A2 (abortions) 46 y.o. pregnant female who presents to ED with cc of 3 days of left lower quadrant abdominal pain and spotty vaginal bleeding. Notes bleeding is mild. States she has not been seen by OB yet for this pregnancy and has not had a ultrasound. Notes LMP around 2021. She denies nausea, vomiting, urinary or fecal changes, fevers or chills.  utilized during HPI. PMHx: Reviewed, as below. PSHx: Reviewed, as below. PCP: None    There are no other complaints verbalized at this time. No past medical history on file. Past Surgical History:   Procedure Laterality Date    HX DILATION AND CURETTAGE  10/2018         No family history on file. Social History     Socioeconomic History    Marital status:      Spouse name: Not on file    Number of children: Not on file    Years of education: Not on file    Highest education level: Not on file   Occupational History    Not on file   Tobacco Use    Smoking status: Never Smoker    Smokeless tobacco: Never Used   Substance and Sexual Activity    Alcohol use: No    Drug use: No    Sexual activity: Yes     Partners: Male     Birth control/protection: None   Other Topics Concern    Not on file   Social History Narrative    Not on file     Social Determinants of Health     Financial Resource Strain:     Difficulty of Paying Living Expenses: Not on file   Food Insecurity:     Worried About Running Out of Food in the Last Year: Not on file    Alexandra of Food in the Last Year: Not on file   Transportation Needs:     Lack of Transportation (Medical): Not on file    Lack of Transportation (Non-Medical):  Not on file   Physical Activity:     Days of Exercise per Week: Not on file    Minutes of Exercise per Session: Not on file   Stress:     Feeling of Stress : Not on file   Social Connections:     Frequency of Communication with Friends and Family: Not on file    Frequency of Social Gatherings with Friends and Family: Not on file    Attends Episcopal Services: Not on file    Active Member of Clubs or Organizations: Not on file    Attends Club or Organization Meetings: Not on file    Marital Status: Not on file   Intimate Partner Violence:     Fear of Current or Ex-Partner: Not on file    Emotionally Abused: Not on file    Physically Abused: Not on file    Sexually Abused: Not on file   Housing Stability:     Unable to Pay for Housing in the Last Year: Not on file    Number of [place] in the Last Year: Not on file    Unstable Housing in the Last Year: Not on file         ALLERGIES: Patient has no known allergies. Review of Systems   Constitutional: Negative for chills and fever. Gastrointestinal: Positive for abdominal pain. Negative for constipation, diarrhea, nausea and vomiting. Genitourinary: Positive for vaginal bleeding. Negative for dysuria and frequency. All other systems reviewed and are negative. Vitals:    12/20/21 0933   BP: 124/82   Pulse: 98   Resp: 18   Temp: 98.2 °F (36.8 °C)   SpO2: 98%            Physical Exam  Vitals and nursing note reviewed. Constitutional:       Appearance: Normal appearance. She is not diaphoretic. HENT:      Head: Atraumatic. Right Ear: External ear normal.      Left Ear: External ear normal.   Eyes:      Conjunctiva/sclera: Conjunctivae normal.   Cardiovascular:      Rate and Rhythm: Normal rate and regular rhythm. Heart sounds: Normal heart sounds. No murmur heard. No friction rub. No gallop. Pulmonary:      Effort: No respiratory distress. Breath sounds: Normal breath sounds. No stridor. No wheezing, rhonchi or rales. Abdominal:      General: Bowel sounds are normal. There is no distension. Palpations: Abdomen is soft. Tenderness: There is abdominal tenderness in the left lower quadrant. There is no guarding or rebound. Hernia: No hernia is present.    Musculoskeletal: General: No swelling. Cervical back: Normal range of motion. No rigidity. Skin:     General: Skin is warm and dry. Neurological:      Mental Status: She is alert and oriented to person, place, and time. Mental status is at baseline. MDM  Number of Diagnoses or Management Options     Amount and/or Complexity of Data Reviewed  Clinical lab tests: ordered and reviewed  Tests in the radiology section of CPT®: ordered and reviewed  Discuss the patient with other providers: yes (Dr. Agustín Post, ED attending)           Procedures          VITAL SIGNS:  Vitals:    12/20/21 0933   BP: 124/82   Pulse: 98   Resp: 18   Temp: 98.2 °F (36.8 °C)   SpO2: 98%         LABS:  Recent Results (from the past 24 hour(s))   CBC WITH AUTOMATED DIFF    Collection Time: 12/20/21 10:13 AM   Result Value Ref Range    WBC 10.6 3.6 - 11.0 K/uL    RBC 4.48 3.80 - 5.20 M/uL    HGB 13.3 11.5 - 16.0 g/dL    HCT 39.7 35.0 - 47.0 %    MCV 88.6 80.0 - 99.0 FL    MCH 29.7 26.0 - 34.0 PG    MCHC 33.5 30.0 - 36.5 g/dL    RDW 12.6 11.5 - 14.5 %    PLATELET 709 813 - 440 K/uL    MPV 10.6 8.9 - 12.9 FL    NRBC 0.0 0  WBC    ABSOLUTE NRBC 0.00 0.00 - 0.01 K/uL    NEUTROPHILS 75 32 - 75 %    LYMPHOCYTES 18 12 - 49 %    MONOCYTES 5 5 - 13 %    EOSINOPHILS 1 0 - 7 %    BASOPHILS 0 0 - 1 %    IMMATURE GRANULOCYTES 1 (H) 0.0 - 0.5 %    ABS. NEUTROPHILS 8.0 1.8 - 8.0 K/UL    ABS. LYMPHOCYTES 1.9 0.8 - 3.5 K/UL    ABS. MONOCYTES 0.5 0.0 - 1.0 K/UL    ABS. EOSINOPHILS 0.1 0.0 - 0.4 K/UL    ABS. BASOPHILS 0.0 0.0 - 0.1 K/UL    ABS. IMM.  GRANS. 0.1 (H) 0.00 - 0.04 K/UL    DF AUTOMATED     METABOLIC PANEL, COMPREHENSIVE    Collection Time: 12/20/21 10:13 AM   Result Value Ref Range    Sodium 137 136 - 145 mmol/L    Potassium 3.6 3.5 - 5.1 mmol/L    Chloride 107 97 - 108 mmol/L    CO2 22 21 - 32 mmol/L    Anion gap 8 5 - 15 mmol/L    Glucose 142 (H) 65 - 100 mg/dL    BUN 5 (L) 6 - 20 MG/DL    Creatinine 0.67 0.55 - 1.02 MG/DL    BUN/Creatinine ratio 7 (L) 12 - 20      GFR est AA >60 >60 ml/min/1.73m2    GFR est non-AA >60 >60 ml/min/1.73m2    Calcium 8.7 8.5 - 10.1 MG/DL    Bilirubin, total 0.2 0.2 - 1.0 MG/DL    ALT (SGPT) 17 12 - 78 U/L    AST (SGOT) 14 (L) 15 - 37 U/L    Alk. phosphatase 72 45 - 117 U/L    Protein, total 7.4 6.4 - 8.2 g/dL    Albumin 3.1 (L) 3.5 - 5.0 g/dL    Globulin 4.3 (H) 2.0 - 4.0 g/dL    A-G Ratio 0.7 (L) 1.1 - 2.2     TYPE & SCREEN    Collection Time: 12/20/21 10:13 AM   Result Value Ref Range    Crossmatch Expiration 12/23/2021,2359     ABO/Rh(D) Simi Carlosini POSITIVE     Antibody screen NEG    URINALYSIS W/MICROSCOPIC    Collection Time: 12/20/21 10:13 AM   Result Value Ref Range    Color YELLOW/STRAW      Appearance CLEAR CLEAR      Specific gravity 1.012 1.003 - 1.030      pH (UA) 6.0 5.0 - 8.0      Protein Negative NEG mg/dL    Glucose Negative NEG mg/dL    Ketone Negative NEG mg/dL    Bilirubin Negative NEG      Blood Negative NEG      Urobilinogen 0.2 0.2 - 1.0 EU/dL    Nitrites Negative NEG      Leukocyte Esterase Negative NEG      WBC 0-4 0 - 4 /hpf    RBC 0-5 0 - 5 /hpf    Epithelial cells FEW FEW /lpf    Bacteria Negative NEG /hpf    Hyaline cast 2-5 0 - 5 /lpf   URINE CULTURE HOLD SAMPLE    Collection Time: 12/20/21 10:13 AM    Specimen: Serum; Urine   Result Value Ref Range    Urine culture hold        Urine on hold in Microbiology dept for 2 days. If unpreserved urine is submitted, it cannot be used for addtional testing after 24 hours, recollection will be required. BETA HCG, QT    Collection Time: 12/20/21 10:13 AM   Result Value Ref Range    Beta HCG, QT 25,875 (H) 0 - 6 MIU/ML         IMAGING:  US UTS TRANSVAGINAL OB   Final Result   No evidence for ovarian torsion. Simple 7.2 cm left ovarian cyst.                        US PREG UTS LTD   Final Result   Single viable intrauterine pregnancy with estimated gestational of   14 weeks 5 days.  6.5 cm left adnexal cyst.               Medications During Visit:  Medications acetaminophen (TYLENOL) tablet 650 mg (650 mg Oral Given 12/20/21 1022)         DECISION MAKING:    Merlinda Petty is a 46 y.o. female who comes in as above. Presents afebrile and hemodynamically stable with some vaginal bleeding left lower quadrant abdominal pain in the setting of pregnancy. Stable H&H. Blood type O+ not indicative of need for RhoGam  UA without evidence of infection. Beta-hCG [id number]. Ultrasound obtained demonstrated single viable IUP with EGA 14 weeks and 5 days as well as 6.5 cm left adnexal cyst.  Good color flow is noted. I have discussed with her ddx of vaginal bleeding the setting of pregnancy. We have discussed close OB follow-up in the next 2 to 3 days for repeat evaluaion as well as close return precautions for any evidence of torsion or worsening bleeding. I have discussed care with ED attending. Pt and I have discussed close return precautions as well as follow up recommendations. Opportunity for questions presented. Pt verbalizes their understanding and agreement with care plan. IMPRESSION:  1. Threatened miscarriage    2.  Vaginal bleeding in pregnancy    3. 14 weeks gestation of pregnancy        DISPOSITION:  Discharged      Discharge Medication List as of 12/20/2021  3:55 PM           Follow-up Information     Follow up With Specialties Details Why Contact Info    [facility] EMERGENCY DEPT Emergency Medicine Go to  As needed, If symptoms worsen, acute pain to your left lower quadrant, new or other concerning symptoms [address] [place] [address]  [phone]    [facility] EMERGENCY DEPT Emergency Medicine Go to  As needed, If symptoms worsen Shanna [phone]    Rosalinda Ahumada MD Obstetrics & Gynecology, Gynecology, Obstetrics Schedule an appointment as soon as possible for a visit   [address]  [phone]              The patient is asked to follow-up with their primary care provider and any other physicians as above. We have discussed strict return precautions and the patient is asked to return promptly for any increased concerns or worsening of symptoms and for return precautions regarding their symptoms today. They can return to this emergency department or any other emergency department. I have discussed with them results as above and presented opportunity for questions. They verbalize their understanding of the above and agreement with care plan. Please note that this dictation was completed with [employer], the computer voice recognition software. Quite often unanticipated grammatical, syntax, homophones, and other interpretive errors are inadvertently transcribed by the computer software. Please disregard these errors. Please excuse any errors that have escaped final proofreading.

## 2022-01-31 ENCOUNTER — INITIAL PRENATAL (OUTPATIENT)
Dept: FAMILY MEDICINE CLINIC | Age: 42
End: 2022-01-31

## 2022-01-31 VITALS
RESPIRATION RATE: 16 BRPM | OXYGEN SATURATION: 97 % | SYSTOLIC BLOOD PRESSURE: 115 MMHG | HEART RATE: 98 BPM | HEIGHT: 62 IN | DIASTOLIC BLOOD PRESSURE: 73 MMHG | BODY MASS INDEX: 37.73 KG/M2 | WEIGHT: 205 LBS

## 2022-01-31 DIAGNOSIS — O09.32 LATE PRENATAL CARE AFFECTING PREGNANCY IN SECOND TRIMESTER: ICD-10-CM

## 2022-01-31 DIAGNOSIS — Z87.59 HISTORY OF SPONTANEOUS ABORTION: ICD-10-CM

## 2022-01-31 DIAGNOSIS — O09.32 INITIAL OBSTETRIC VISIT IN SECOND TRIMESTER: Primary | ICD-10-CM

## 2022-01-31 DIAGNOSIS — O99.210 MATERNAL OBESITY AFFECTING PREGNANCY, ANTEPARTUM: ICD-10-CM

## 2022-01-31 DIAGNOSIS — O09.521 MULTIGRAVIDA OF ADVANCED MATERNAL AGE IN FIRST TRIMESTER: ICD-10-CM

## 2022-01-31 LAB
BILIRUB UR QL STRIP: NEGATIVE
GLUCOSE UR-MCNC: NEGATIVE MG/DL
KETONES P FAST UR STRIP-MCNC: NORMAL MG/DL
PH UR STRIP: 5.5 [PH] (ref 4.6–8)
PROT UR QL STRIP: NORMAL
SP GR UR STRIP: 1.03 (ref 1–1.03)
UA UROBILINOGEN AMB POC: NORMAL (ref 0.2–1)
URINALYSIS CLARITY POC: NORMAL
URINALYSIS COLOR POC: YELLOW
URINE BLOOD POC: NEGATIVE
URINE LEUKOCYTES POC: NEGATIVE
URINE NITRITES POC: NEGATIVE

## 2022-01-31 PROCEDURE — 0501F PRENATAL FLOW SHEET: CPT | Performed by: STUDENT IN AN ORGANIZED HEALTH CARE EDUCATION/TRAINING PROGRAM

## 2022-01-31 PROCEDURE — 90686 IIV4 VACC NO PRSV 0.5 ML IM: CPT | Performed by: STUDENT IN AN ORGANIZED HEALTH CARE EDUCATION/TRAINING PROGRAM

## 2022-01-31 PROCEDURE — 81003 URINALYSIS AUTO W/O SCOPE: CPT | Performed by: STUDENT IN AN ORGANIZED HEALTH CARE EDUCATION/TRAINING PROGRAM

## 2022-01-31 RX ORDER — ACETAMINOPHEN 500 MG
500 TABLET ORAL
COMMUNITY
End: 2022-07-11

## 2022-01-31 RX ORDER — ASPIRIN 81 MG/1
81 TABLET ORAL DAILY
Qty: 30 TABLET | Refills: 5 | Status: SHIPPED | OUTPATIENT
Start: 2022-01-31 | End: 2022-07-11

## 2022-01-31 NOTE — PATIENT INSTRUCTIONS
Chance Kent a max médico rony el embarazo (después de 20 semanas)  Learning About When to Call Your Doctor During Pregnancy (After 20 Weeks)  Instrucciones de cuidado  Es normal que tenga inquietudes acerca de lo que podría ser un problema rony el Jodine Garcia. Aunque la mayoría de las mujeres embarazadas no tienen ningún problema grave, es importante saber cuándo llamar a max médico si tiene determinados síntomas o señales de trabajo de Marilyn. Estas son algunas sugerencias generales. Max médico puede darle más información sobre cuándo llamar. Cuándo llamar a max médico (después de 20 semanas)  Llame al 911  en cualquier momento que considere que necesita atención de [place]. Por ejemplo, llame si:  · Tiene sangrado vaginal intenso. · Tiene dolor repentino e intenso en el abdomen. · Se desmayó (perdió el conocimiento). · Tiene stephanie convulsión. · Ve o siente el cordón umbilical.  · Allyson que está a punto de mars a yolande a mxa bebé y no puede llegar en forma rios al hospital.  Norvel Reil a max médico ahora mismo o busque atención médica inmediata si:  · Tiene sangrado vaginal.  · Tiene dolor en el abdomen. · Tiene fiebre. · Tiene síntomas de preeclampsia, jayesh:  ? Hinchazón repentina de la kiara, las anthony o los pies. ? Nuevos problemas de visión (jayesh oscurecimiento, isra borroso o isra puntos). ? Dolor de shanna intenso. · Tiene stephanie pérdida repentina de líquido por la vagina. (Piensa que rompió la daly). · Piensa que puede jasmina comenzado el [place] de Marilyn. [place] significa que ha tenido al menos 6 contracciones en [employer]. · Nota que max bebé ha dejado de moverse o lo hace mucho menos de lo habitual.  · Tiene síntomas de stephanie infección urinaria. Estos pueden incluir:  ? Dolor o ardor al orinar. ? Necesidad de orinar con frecuencia sin poder eliminar mucha orina. ? Dolor en el flanco, que se encuentra sona debajo de la caja torácica y [place] de la cintura en un lado de la espalda.   ? Mook en la [place]ail [place] especial atención a los cambios en watson candy y asegúrese de comunicarse con watson médico si:  · Tiene flujo vaginal con un olor desagradable. · Tiene cambios en la piel, tales jayesh:  ? Salpullido. ? Comezón. ? Color amarillento en la piel. · Tiene otras inquietudes acerca de watson embarazo. Si tiene signos de trabajo de parto al llegar a las [address] o más  Si tiene señales de [place] de parto a las 37 semanas o [place], es posible que watson médico le diga que llame cuando watson trabajo de parto se vuelva más [place]. Los síntomas del trabajo de parto activo incluyen:  · Contracciones que son regulares. · Contracciones a intervalos de menos de 5 minutos. · Contracciones rony las cuales es difícil hablar. La atención de seguimiento es stehpanie parte clave de watson tratamiento y seguridad. Asegúrese de hacer y acudir a todas las citas, y llame a watson médico si está teniendo problemas. También es stephanie buena idea saber los resultados de linda exámenes y mantener stephanie lista de los medicamentos que agnes. ¿Dónde puede encontrar más información en inglés? Vaya a http://www.gray.com/  Dotty N531 en la búsqueda para aprender más acerca de \"Aprenda cuándo llamar a watson médico rony el embarazo (después de 20 semanas). \"  Revisado: 16 junio, 2021               Versión del contenido: 13.0  © 3660-1206 Healthwise, Incorporated. Las instrucciones de cuidado fueron adaptadas bajo licencia por [employer] (which disclaims liability or warranty for this information). Si usted tiene [place] afección médica o sobre estas instrucciones, siempre pregunte a watson profesional de candy. [facility] niega toda garantía o responsabilidad por watosn uso de esta información.

## 2022-01-31 NOTE — PROGRESS NOTES
Chief Complaint   Patient presents with    Initial Prenatal Visit     20 week 5 day       Patient identified with 2 patient identifiers (name and D. O. B)    Patient is a  at Unknown    Leakage of Fluid: NO  Vaginal Bleeding: NO  Fetal Movement: YES  Prenatal vitamins: YES  Having Contractions: NO  Pain: NO    Visit Vitals  /73 (BP 1 Location: Right arm, BP Patient Position: Sitting)   Pulse 98   Resp 16   Ht 5' 2\" (1.575 m)   Wt 205 lb (93 kg)   LMP  (LMP Unknown)   SpO2 97%   BMI 37.49 kg/m²         There is no immunization history on file for this patient. 1. Have you been to the ER, urgent care clinic since your last visit? Hospitalized since your last visit? Yes seen at ED 2021     2. Have you seen or consulted any other health care providers outside of the [address] [place] [address] since your last visit? Include any pap smears or colon screening. No      Periods irregular so unsure of LMP, in ED patient was told her JC 6/15/22.  Patient states having a cyst on left ovary

## 2022-01-31 NOTE — PROGRESS NOTES
I reviewed with the resident the medical history and the resident's findings on the physical examination. I discussed with the resident the patient's diagnosis and concur with the plan. 42yo  @ 20w4d by 14wk scan at [facility] (seen for VB which has since resolved)  1. IUP: IOB labs today, pt refuses pap  2. AMA: ASA  3.  Obesity: early GTT+A1C   4.   Late to care: at 20+ weeks, check UDS

## 2022-01-31 NOTE — PROGRESS NOTES
Subjective:   Mohini Ibanez is a 46 y.o. who is being seen today for her first initial obstetrical visit. This is a planned pregnancy. LMP unknown (irregular cycles), ED US on 22 EGA 14w5d. She was seen in ED on 21 for threatened . Has not had any vaginal bleeding since. No prenatal care. See flow sheet for OB history. History of GDM or DM? No  History of GHTN or HTN? No  History of pre-eclampsia? No  History of Genetic disorders? No, interested in genetic testing  Taking prenatal vitamins? yes    Immunizations: COVID vaccinated (x2, Ammon Akins), Did not receive flu shot this season    Allergies- reviewed:   No Known Allergies    Medications- reviewed:   Current Outpatient Medications   Medication Sig    acetaminophen (Tylenol Extra Strength) 500 mg tablet Take 500 mg by mouth every six (6) hours as needed for Pain.  prenatal multivit-ca-min-fe-fa (PRENATAL VITAMIN) tab Take 1 Tab by mouth daily. No current facility-administered medications for this visit. Past Medical History- reviewed:  History reviewed. No pertinent past medical history.     Past Surgical History- reviewed:   Past Surgical History:   Procedure Laterality Date    HX DILATION AND CURETTAGE  10/2018       Social History- reviewed:  Social History     Socioeconomic History    Marital status:      Spouse name: Not on file    Number of children: Not on file    Years of education: Not on file    Highest education level: Not on file   Occupational History    Not on file   Tobacco Use    Smoking status: Never Smoker    Smokeless tobacco: Never Used   Substance and Sexual Activity    Alcohol use: Never    Drug use: Never    Sexual activity: Yes     Partners: Male     Birth control/protection: None   Other Topics Concern    Not on file   Social History Narrative    Not on file     Social Determinants of Health     Financial Resource Strain:     Difficulty of Paying Living Expenses: Not on file Food Insecurity:     Worried About Running Out of Food in the Last Year: Not on file    Alexandra of Food in the Last Year: Not on file   Transportation Needs:     Lack of Transportation (Medical): Not on file    Lack of Transportation (Non-Medical):  Not on file   Physical Activity:     Days of Exercise per Week: Not on file    Minutes of Exercise per Session: Not on file   Stress:     Feeling of Stress : Not on file   Social Connections:     Frequency of Communication with Friends and Family: Not on file    Frequency of Social Gatherings with Friends and Family: Not on file    Attends [employer]ist Services: Not on file    Active Member of Clubs or Organizations: Not on file    Attends Club or Organization Meetings: Not on file    Marital Status: Not on file   Intimate Partner Violence:     Fear of Current or Ex-Partner: Not on file    Emotionally Abused: Not on file    Physically Abused: Not on file    Sexually Abused: Not on file   Housing Stability:     Unable to Pay for Housing in the Last Year: Not on file    Number of [place] in the Last Year: Not on file    Unstable Housing in the Last Year: Not on file         Objective:     Visit Vitals  /73 (BP 1 Location: Right arm, BP Patient Position: Sitting)   Pulse 98   Resp 16   Ht 5' 2\" (1.575 m)   Wt 205 lb (93 kg)   LMP  (LMP Unknown)   SpO2 97%   BMI 37.49 kg/m²       See physical exam on flowsheet  [employer] exam chaperoned by Chava Melton RN    Labs:  Recent Results (from the past 12 hour(s))   AMB POC URINALYSIS DIP STICK AUTO W/O MICRO    Collection Time: 01/31/22  3:08 PM   Result Value Ref Range    Color (UA POC) Yellow     Clarity (UA POC) Slightly Cloudy     Glucose (UA POC) Negative Negative    Bilirubin (UA POC) Negative Negative    Ketones (UA POC) Trace Negative    Specific gravity (UA POC) 1.030 1.001 - 1.035    Blood (UA POC) Negative Negative    pH (UA POC) 5.5 4.6 - 8.0    Protein (UA POC) Trace Negative    Urobilinogen (UA POC) 0.2 mg/dL 0.2 - 1    Nitrites (UA POC) Negative Negative    Leukocyte esterase (UA POC) Negative Negative         Assessment and Plan:   46 y.o. G5 [id number]  Estimated Date of Delivery: 6/15/22 (by 14 w ED US). here for initial OB visit. IUP: at 20w4d   - PNL: O+  - Initial OB labs collected  - Discussed recommended weight gain   - Discussed optional genetic screening: will collect NIPT  - Decline pap smear today, will obtain postpartum  - Edwardo Malik to make anatomy scan appointment for patient. - Administer flu shot today  - Follow up in 4 weeks    SAB: x2. First at ? 5months (pt reports <20w) and 2nd at 7w (2019) s/p D&C per Dr. Patterson [facility] notes. Threatened : 21, evaluated by ED. No further bleeding since.   - Continue to monitor    AMA:  - ASA daily    Obesity:   - A1c and early GTT obtained  - ASA daily    Late prenatal care:   - UDS    Orders Placed This Encounter    THER/PROPH/DIAG INJECTION, SUBCUT/IM    CULTURE, URINE     Standing Status:   Future     Number of Occurrences:   1     Standing Expiration Date:   2023    INFLUENZA VIRUS VAC QUAD,SPLIT,PRESV FREE SYRINGE IM (Flulaval, Fluzone, Fluarix) (84180)    CBC W/O DIFF     Standing Status:   Future     Number of Occurrences:   1     Standing Expiration Date:   2023    RUBELLA AB, IGG     Standing Status:   Future     Number of Occurrences:   1     Standing Expiration Date:   2022    VZV AB, IGG     Standing Status:   Future     Number of Occurrences:   1     Standing Expiration Date:   2023    HEP B SURFACE AG     Standing Status:   Future     Number of Occurrences:   1     Standing Expiration Date:   2023    RPR     Standing Status:   Future     Number of Occurrences:   1     Standing Expiration Date:   2022    HIV 1/2 AG/AB, 4TH GENERATION,W RFLX CONFIRM     Standing Status:   Future     Number of Occurrences:   1     Standing Expiration Date:   2023    HEMOGLOBIN FRACTIONATION     Standing Status:   Future     Number of Occurrences:   1     Standing Expiration Date:   7/31/2022    CHLAMYDIA / GC-AMPLIFIED     Standing Status:   Future     Number of Occurrences:   1     Standing Expiration Date:   1/31/2023     Order Specific Question:   Specimen source     Answer:   Urine [258]    DRUG SCREEN, URINE     Standing Status:   Future     Number of Occurrences:   1     Standing Expiration Date:   1/31/2023    GLUCOSE, GESTATIONAL 1 HR TOLERANCE     Standing Status:   Future     Number of Occurrences:   1     Standing Expiration Date:   2/1/2023    HEMOGLOBIN A1C WITH EAG     Standing Status:   Future     Number of Occurrences:   1     Standing Expiration Date:   1/31/2023    HEPATITIS C AB     Standing Status:   Future     Number of Occurrences:   1     Standing Expiration Date:   1/31/2023    AMB POC URINALYSIS DIP STICK AUTO W/O MICRO    ANTIBODY SCREEN     Standing Status:   Future     Number of Occurrences:   1     Standing Expiration Date:   7/31/2022    acetaminophen (Tylenol Extra Strength) 500 mg tablet     Sig: Take 500 mg by mouth every six (6) hours as needed for Pain. I have discussed the diagnosis with the patient and the intended plan as seen in the above orders. The patient has received an after-visit summary and questions were answered concerning future plans. I have discussed medication side effects and warnings with the patient as well. Informed pt to return to the office or go to the ER if she experiences vaginal bleeding, vaginal discharge, leaking of fluid, pelvic cramping.       Pt seen and discussed with Shaneka (attending physician)    Torsten Webster DO  Family Medicine Resident

## 2022-02-01 LAB
AMPHET UR QL SCN: NEGATIVE
BARBITURATES UR QL SCN: NEGATIVE
BENZODIAZ UR QL: NEGATIVE
BLOOD BANK CMNT PATIENT-IMP: NORMAL
BLOOD GROUP ANTIBODIES SERPL: NORMAL
CANNABINOIDS UR QL SCN: NEGATIVE
COCAINE UR QL SCN: NEGATIVE
DRUG SCRN COMMENT,DRGCM: NORMAL
ERYTHROCYTE [DISTWIDTH] IN BLOOD BY AUTOMATED COUNT: 13.2 % (ref 11.5–14.5)
EST. AVERAGE GLUCOSE BLD GHB EST-MCNC: 117 MG/DL
GLUCOSE 1H P 100 G GLC PO SERPL-MCNC: 225 MG/DL (ref 65–140)
HBA1C MFR BLD: 5.7 % (ref 4–5.6)
HBV SURFACE AG SER QL: 0.44 INDEX
HBV SURFACE AG SER QL: NEGATIVE
HCT VFR BLD AUTO: 37.4 % (ref 35–47)
HCV AB SERPL QL IA: NONREACTIVE
HGB BLD-MCNC: 11.9 G/DL (ref 11.5–16)
HIV 1+2 AB+HIV1 P24 AG SERPL QL IA: NONREACTIVE
HIV12 RESULT COMMENT, HHIVC: NORMAL
MCH RBC QN AUTO: 29.4 PG (ref 26–34)
MCHC RBC AUTO-ENTMCNC: 31.8 G/DL (ref 30–36.5)
MCV RBC AUTO: 92.3 FL (ref 80–99)
METHADONE UR QL: NEGATIVE
NRBC # BLD: 0 K/UL (ref 0–0.01)
NRBC BLD-RTO: 0 PER 100 WBC
OPIATES UR QL: NEGATIVE
PCP UR QL: NEGATIVE
PLATELET # BLD AUTO: 216 K/UL (ref 150–400)
PMV BLD AUTO: 11.2 FL (ref 8.9–12.9)
RBC # BLD AUTO: 4.05 M/UL (ref 3.8–5.2)
RPR SER QL: NONREACTIVE
RUBV IGG SER-IMP: REACTIVE
RUBV IGG SERPL IA-ACNC: 179.7 IU/ML
WBC # BLD AUTO: 12.4 K/UL (ref 3.6–11)

## 2022-02-02 LAB
BACTERIA SPEC CULT: NORMAL
C TRACH RRNA SPEC QL NAA+PROBE: NEGATIVE
N GONORRHOEA RRNA SPEC QL NAA+PROBE: NEGATIVE
SERVICE CMNT-IMP: NORMAL
SPECIMEN SOURCE: NORMAL
VZV IGG SER IA-ACNC: 176 INDEX

## 2022-02-03 LAB
HGB A MFR BLD: 97 % (ref 96.4–98.8)
HGB A2 MFR BLD COLUMN CHROM: 3 % (ref 1.8–3.2)
HGB F MFR BLD: 0 % (ref 0–2)
HGB FRACT BLD-IMP: NORMAL
HGB S MFR BLD: 0 %

## 2022-02-04 ENCOUNTER — TELEPHONE (OUTPATIENT)
Dept: FAMILY MEDICINE CLINIC | Age: 42
End: 2022-02-04

## 2022-02-04 ENCOUNTER — ROUTINE PRENATAL (OUTPATIENT)
Dept: FAMILY MEDICINE CLINIC | Age: 42
End: 2022-02-04

## 2022-02-04 DIAGNOSIS — O24.410 DIET CONTROLLED GESTATIONAL DIABETES MELLITUS (GDM) IN SECOND TRIMESTER: Primary | ICD-10-CM

## 2022-02-04 PROCEDURE — 0502F SUBSEQUENT PRENATAL CARE: CPT | Performed by: STUDENT IN AN ORGANIZED HEALTH CARE EDUCATION/TRAINING PROGRAM

## 2022-02-04 RX ORDER — BLOOD SUGAR DIAGNOSTIC
STRIP MISCELLANEOUS
Qty: 90 STRIP | Refills: 1 | Status: SHIPPED | OUTPATIENT
Start: 2022-02-04 | End: 2022-05-31

## 2022-02-04 RX ORDER — BLOOD-GLUCOSE METER
EACH MISCELLANEOUS
Qty: 1 EACH | Refills: 0 | Status: SHIPPED | OUTPATIENT
Start: 2022-02-04 | End: 2022-05-31

## 2022-02-04 RX ORDER — LANCETS
EACH MISCELLANEOUS
Qty: 1 EACH | Refills: 11 | Status: SHIPPED | OUTPATIENT
Start: 2022-02-04 | End: 2022-05-31

## 2022-02-04 NOTE — TELEPHONE ENCOUNTER
Called patient using int # [id number] to inform of appointment on 2/9/22 at 9:40A. Displayed understanding.     Ebony Valencia DO

## 2022-02-04 NOTE — PROGRESS NOTES
I reviewed with the resident, Nate Phelps DO, the medical history and the resident's findings on the physical examination. I discussed with the resident the patient's diagnosis and concur with the plan. Sonny Schneider is a 46 y.o. female  at 18w1d. presents for routine PNC. 2022, by Ultrasound  Concerns addressed: Gestational (vs pre-gestational DM) with nutrition counseling. Referral to DM education/nutrition. Anatomy US scheduled for . Pregnancy complicated by: Hx two SAB, threatened  with this pregnancy  Denies VB, LOF, Contractions. +Fetal movement. RTC in 1-2 weeks    Virtual visit  OB Labs reviewed. Brief Summary Below.

## 2022-02-04 NOTE — PROGRESS NOTES
George Cortez  46 y.o. female  1980  [address] [facility]  [address] [address] [facility] [address] [id number]  [id number]    [phone] (home)      [address]:    [place] Telephone Encounter  Diego Booker DO       Encounter Date: 2022 at 3:00 PM    Consent:  She and/or the health care decision maker is aware that this encounter will be billed as a routine OB appointment and that she may receive a bill for this telephone service, depending on her insurance coverage, and has provided verbal consent to proceed: Yes    Follow-up Prenatal     History of Present Illness   Int #[id number]    Patient presents for follow up  Had an early 1 hr GTT that returned positive at 225. Contractions: no  LOF: no  Vaginal bleeding: no  Fetal movement (if >20 wk): yes      Vitals/Objective:   General: Patient speaking in complete sentences without effort. Normal speech and cooperative. Due to this being a Virtual Check-in/Telephone evaluation, many elements of the physical examination are unable to be assessed. Assessment and Plan:   George Cortez is a 46 y.o. G5 [id number] @ 21w1d evaluated by telephone. IUP: at 21w1d   - PNL: O+, ab neg, Hg frac wnl, Rubella and VZV immune, HIV neg, Hep C and Hep B nr, RPR nr, A1c 5.7, early 1hr positive, Hgb 11.9  - S/p flu  - US scheduled on 22  - Follow up to be scheduled in 1 week    GDM early 1 hr positive at 225.  - Patient instructed to check blood glucose qid and to keep a log.  - Discussed low carb food options  - Discussed the risks of uncontrolled diabetes including increased mortality, increased risk of preeclampsia, increased risk of birth injury/death, and increased risk of developing DM2  - Diabetic supplies sent to pharmacy. Patient instructed on how to use meter  - Referred to diabetic education     SAB: x2. First at ? 5months (pt reports <20w) and 2nd at 7w (2019) s/p D&C per Dr. Augusto Kayser notes.      Threatened : 21, evaluated by ED.  No further bleeding since.     AMA:  - ASA daily     Obesity A1c 5.7 and early GTT positive  - ASA daily     Late prenatal care: UDS neg    -Patient informed of her next appointment: 2/28/2022 but will contact Jeremiah Alejo to schedule an earlier appointment  -Advised to come in sooner for any concerns  -Pt informed that after 28 wk she will be asked to do weekly home BP monitoring and it was recommended she buy or borrow a cuff ahead of time. Alternative is going to a grocery store/pharmacy to check. One BP should be checked weekly and written in a log >28 weeks.  -Patient educated on kick counts (after 28 weeks): baby should move 10 times in 2 hours (can be a kick, roll, flutter, swish). -Patient was reminded about social distancing and to avoid going into public when possible. Patient understands that this encounter was a temporary measure, and the importance of further follow up and examination was emphasized. Patient verbalized understanding. I affirm this is a Patient Initiated Episode with an Established Patient who has not had a related appointment within my department in the past 7 days or scheduled within the next 24 hours. Note: not billable if this call serves to triage the patient into an appointment for the relevant concern      Electronically Signed: Nate Phelps DO  Providers location when delivering service:     No diagnosis found. Pursuant to the emergency declaration under the [facility], [facility] waiver authority and the [employer] Act, this Virtual  Visit was conducted, with patient's consent, to reduce the patient's risk of exposure to COVID-19 and provide continuity of care for an established patient. History   Patients past medical, surgical and family histories were personally reviewed and updated. yes    There is no problem list on file for this patient.              Current Medications/Allergies Medications and Allergies reviewed:    Current Outpatient Medications   Medication Sig Dispense Refill    acetaminophen (Tylenol Extra Strength) 500 mg tablet Take 500 mg by mouth every six (6) hours as needed for Pain.  aspirin delayed-release 81 mg tablet Take 1 Tablet by mouth daily. 30 Tablet 5    prenatal multivit-ca-min-fe-fa (PRENATAL VITAMIN) tab Take 1 Tab by mouth daily.  90 Tab 4     No Known Allergies

## 2022-02-07 PROBLEM — R73.03 PREDIABETES: Status: ACTIVE | Noted: 2022-02-07

## 2022-02-07 PROBLEM — O09.32 LATE PRENATAL CARE AFFECTING PREGNANCY IN SECOND TRIMESTER: Status: ACTIVE | Noted: 2022-02-07

## 2022-02-07 PROBLEM — O09.521 MULTIGRAVIDA OF ADVANCED MATERNAL AGE IN FIRST TRIMESTER: Status: ACTIVE | Noted: 2022-02-07

## 2022-02-07 PROBLEM — Z87.59 HISTORY OF SPONTANEOUS ABORTION: Status: ACTIVE | Noted: 2022-02-07

## 2022-02-07 PROBLEM — O24.410 GDM, CLASS A1: Status: ACTIVE | Noted: 2022-02-07

## 2022-02-07 PROBLEM — O99.210 OBESITY IN PREGNANCY: Status: ACTIVE | Noted: 2022-02-07

## 2022-02-07 NOTE — PROGRESS NOTES
PNL: Blood Type: O POSITIVE, Hgb 11.9, Ab NEG, Hgb frc wnl, HCV NR, HIV NR, RPR NR, HBsAg neg, GC/CT neg, VZV immune, Rubella immune  UDS neg  Ucx NG  A1C 5.7 (prediabetic), 1'GTT >200.  Scheduled for GDM visit

## 2022-02-08 ENCOUNTER — HOSPITAL ENCOUNTER (OUTPATIENT)
Dept: PERINATAL CARE | Age: 42
Discharge: HOME | End: 2022-02-08
Attending: OBSTETRICS & GYNECOLOGY

## 2022-02-08 PROCEDURE — 76811 OB US DETAILED SNGL FETUS: CPT | Performed by: OBSTETRICS & GYNECOLOGY

## 2022-02-09 ENCOUNTER — ROUTINE PRENATAL (OUTPATIENT)
Dept: FAMILY MEDICINE CLINIC | Age: 42
End: 2022-02-09

## 2022-02-09 VITALS
RESPIRATION RATE: 16 BRPM | HEIGHT: 62 IN | DIASTOLIC BLOOD PRESSURE: 77 MMHG | WEIGHT: 208.8 LBS | BODY MASS INDEX: 38.42 KG/M2 | SYSTOLIC BLOOD PRESSURE: 112 MMHG

## 2022-02-09 DIAGNOSIS — Z87.59 HISTORY OF SPONTANEOUS ABORTION: ICD-10-CM

## 2022-02-09 DIAGNOSIS — O99.210 MATERNAL OBESITY AFFECTING PREGNANCY, ANTEPARTUM: ICD-10-CM

## 2022-02-09 DIAGNOSIS — O09.522 MULTIGRAVIDA OF ADVANCED MATERNAL AGE IN SECOND TRIMESTER: ICD-10-CM

## 2022-02-09 DIAGNOSIS — O24.410 DIET CONTROLLED GESTATIONAL DIABETES MELLITUS (GDM) IN SECOND TRIMESTER: Primary | ICD-10-CM

## 2022-02-09 DIAGNOSIS — O09.32 LATE PRENATAL CARE AFFECTING PREGNANCY IN SECOND TRIMESTER: ICD-10-CM

## 2022-02-09 PROCEDURE — 0502F SUBSEQUENT PRENATAL CARE: CPT | Performed by: FAMILY MEDICINE

## 2022-02-09 NOTE — PROGRESS NOTES
Return OB Visit     AMN# [id number] assisted with this call d/t language barrier. Subjective:   Hari Robbins 46 y.o. G5   JC: 6/16/2022, by Ultrasound  GA:  21w6d. LOF: No   Vaginal bleeding: No   Fetal movement (after 20 weeks): Yes   Contractions: No   Dysuria: No   Headaches, blurred vision, RUQ pain: No   Taking prenatal vitamins: Yes     BG log: fasting and 1h post prandial (dates written in  format)            Allergies- reviewed:   No Known Allergies  Medications- reviewed:   Current Outpatient Medications   Medication Sig    lancets misc Check blood glucose 4 times daily. In the morning fasting and two hours after each meal    glucose blood VI test strips (ReliOn Prime Test Strips) strip Check blood glucose 4 times daily. In the morning fasting and two hours after each meal    Blood-Glucose Meter (ReliOn Prime Meter) misc Check blood glucose 4 times daily. In the morning fasting and two hours after each meal    acetaminophen (Tylenol Extra Strength) 500 mg tablet Take 500 mg by mouth every six (6) hours as needed for Pain.  aspirin delayed-release 81 mg tablet Take 1 Tablet by mouth daily.  prenatal multivit-ca-min-fe-fa (PRENATAL VITAMIN) tab Take 1 Tab by mouth daily. No current facility-administered medications for this visit. Past Medical History- reviewed:  No past medical history on file.   Past Surgical History- reviewed:   Past Surgical History:   Procedure Laterality Date    HX DILATION AND CURETTAGE  10/2018     Social History- reviewed:  Social History     Socioeconomic History    Marital status:      Spouse name: Not on file    Number of children: Not on file    Years of education: Not on file    Highest education level: Not on file   Occupational History    Not on file   Tobacco Use    Smoking status: Never Smoker    Smokeless tobacco: Never Used   Substance and Sexual Activity    Alcohol use: Never    Drug use: Never    Sexual activity: Yes Partners: Male     Birth control/protection: None   Other Topics Concern    Not on file   Social History Narrative    Not on file     Social Determinants of Health     Financial Resource Strain:     Difficulty of Paying Living Expenses: Not on file   Food Insecurity:     Worried About Running Out of Food in the Last Year: Not on file    Alexandra of Food in the Last Year: Not on file   Transportation Needs:     Lack of Transportation (Medical): Not on file    Lack of Transportation (Non-Medical):  Not on file   Physical Activity:     Days of Exercise per Week: Not on file    Minutes of Exercise per Session: Not on file   Stress:     Feeling of Stress : Not on file   Social Connections:     Frequency of Communication with Friends and Family: Not on file    Frequency of Social Gatherings with Friends and Family: Not on file    Attends Judaism Services: Not on file    Active Member of [address] [place] [address] Language Learning Class or Organizations: Not on file    Attends Club or Organization Meetings: Not on file    Marital Status: Not on file   Intimate Partner Violence:     Fear of Current or Ex-Partner: Not on file    Emotionally Abused: Not on file    Physically Abused: Not on file    Sexually Abused: Not on file   Housing Stability:     Unable to Pay for Housing in the Last Year: Not on file    Number of [place] in the Last Year: Not on file    Unstable Housing in the Last Year: Not on file     Immunizations- reviewed:   Immunization History   Administered Date(s) Administered    Influenza Vaccine Branding Brand) PF (>6 Mo Flulaval, Fluarix, and >3 Yrs Brittney Alvarez 11546) 01/31/2022       Objective:     Visit Vitals  /77 (BP 1 Location: Left arm, BP Patient Position: Sitting, BP Cuff Size: Adult long)   Resp 16   Ht 5' 2\" (1.575 m)   Wt 208 lb 12.8 oz (94.7 kg)   LMP  (LMP Unknown)   BMI 38.19 kg/m²       Physical Exam:  See OB episode     Labs  No results found for this or any previous visit (from the past 12 hour(s)). Assessment         ICD-10-CM ICD-9-CM    1. Diet controlled gestational diabetes mellitus (GDM) in second trimester  O24.410 648.83    2. Late prenatal care affecting pregnancy in second trimester  O09.32 V23.7    3. Maternal obesity affecting pregnancy, antepartum  O99.210 649.13    4. History of spontaneous   Z87.59 V13.29    5. Multigravida of advanced maternal age in second trimester  O09.522 65.56          Plan   46 y.o. G5  21w6d JC 2022, by Ultrasound here for return OB visit     - PNL: O+, ab neg, Hg frac wnl, Rubella and VZV immune, HIV neg, Hep C and Hep B nr, RPR nr, A1c 5.7, early 1hr positive, Hgb 11.9  - S/p flu  - US 22: results pending, per patient anatomy abnormalities noted and recommended genetic testing      1. IUP: at 21w6d: doing well  - Getting genetic testing today    2. GDM early 1 hr positive at 225.  - Patient instructed to check blood glucose qid and to keep a log, check 2h post prandial BG in stead of 1 h  - Discussed low carb food options, risks of uncontrolled diabetes including increased mortality, increased risk of preeclampsia, increased risk of birth injury/death, and increased risk of developing DM2, increased likelihood of need to use insulin given miserably failed 1h GTT  - Referred to diabetic education: visit tomorrow    3. SAB: x2. First at ? 5months (pt reports <20w) and 2nd at 7w (2019) s/p D&C per Dr. Patterson [facility] notes. 4. Threatened : 21, evaluated by ED. No bleeding since. 5. AMA: ASA daily    6. Obesity A1c 5.7 and early GTT positive    7. Late prenatal care: UDS neg        No orders of the defined types were placed in this encounter. Labor precautions discussed, including: Regular painful contractions, lasting for greater than one hour, taking your breath away; any vaginal bleeding; any leakage of fluid; or absent or decreased fetal movement. Call M.D. on call if any of these symptoms or signs occur.     I have discussed the diagnosis with the patient and the intended plan as seen in the above orders. The patient has received an after-visit summary and questions were answered concerning future plans. I have discussed medication side effects and warnings with the patient as well. Informed pt to return to the office or go to the ER if she experiences vaginal bleeding, vaginal discharge, leaking of fluid, pelvic cramping.     Pt seen and discussed with Dr. Tristan Alfred (attending physician)    Farhan Asher MD  Family Medicine Resident

## 2022-02-09 NOTE — PROGRESS NOTES
Chief Complaint   Patient presents with    Routine Prenatal Visit     Visit Vitals  /77 (BP 1 Location: Left arm, BP Patient Position: Sitting, BP Cuff Size: Adult long)   Resp 16   Ht 5' 2\" (1.575 m)   Wt 208 lb 12.8 oz (94.7 kg)   BMI 38.19 kg/m²     Patient is here for a routine prenatal appointment. She is 21 weeks 6 days pregnant. + FM, no vaginal bleeding or leaking of fluid.

## 2022-02-09 NOTE — PROGRESS NOTES
I reviewed with the resident the medical history and the resident's findings on the physical examination. I discussed with the resident the patient's diagnosis and concur with the plan. 42yo G5 [id number] @ 21w6d by 14wk scan at [facility] (seen for VB which has since resolved)  1. IUP: IOB labs today, pt refuses pap,   2. AMA: ASA  3.  Obesity: early GTT+A1C   4. Late to care: at 20+ weeks, check UDS   5. Abnormal anatomy scan : pt reports (as report not scanned in) findings of T21 on anatomy scan so will get NIPT today   6. Early GDM with preDM: log hard to interpret, but suspect she will need some insulin but she will work on her diet for one more week, discussed risks of uncontrolled glucose including but not limited to macrosomia, birth injury, increased need for , birth defects, miscarriage/fetal demise,  hypoglycemia, persistent diabetes after pregnancy.         Estimated Date of Delivery: 22

## 2022-02-10 ENCOUNTER — CLINICAL SUPPORT (OUTPATIENT)
Dept: DIABETES SERVICES | Age: 42
End: 2022-02-10

## 2022-02-10 DIAGNOSIS — O24.410 DIET CONTROLLED GESTATIONAL DIABETES MELLITUS (GDM) IN SECOND TRIMESTER: Primary | ICD-10-CM

## 2022-02-10 PROCEDURE — G0108 DIAB MANAGE TRN  PER INDIV: HCPCS | Performed by: DIETITIAN, REGISTERED

## 2022-02-10 NOTE — PROGRESS NOTES
[employer] Program for Diabetes Health  Diabetes Self-Management Education & Support Program  Encounter note    SUMMARY  Diabetes self-care management training was completed related to Healthy eating and Monitoring. The participant will return on February 24 to continue DSMES related to Healthy eating, Monitoring and Physical activity. The participant did identify SMART Goal(s): - to reduce portions of carbohydrates to suggested goals to keep BS in target, and will practice knowledge and skills related to healthy eating and monitoring to improve diabetes self-management. EVALUATION:  Pt has not had GDM in past.  Was not aware of carbohydrate sources so was overdoing them. We discussed traditional [place] foods in her diet as well. She would like to avoid meds if she can . She has d/c all sweet beverages. She is not exercising at this time. She was able to create a balanced plate with some assistance but think it will take time and practice for her to reduce food down to correct portions. When she does eat out they go to the [facility] buffet so this was also discussed    RECOMMENDATIONS:  Limit breakfast to 2 carb choices; lunch and dinner to 3 carb choices; snacks to 1-2 choices mid morning. Protein at all meals/snack and increase non starchy veggies  Try to take a walk daily  Continue testing 4/day and recording results     Next provider visit is scheduled for 2/16/22    Pt was seen with help of [facility] language services Job# [id number] with [address] EVALUATION     2/10/2022 WHAT IS DIABETES?   a. Role of the normal pancreas in energy balance and blood glucose control   b. The defect seen in diabetes   c. Signs & symptoms of diabetes   d. Diagnosis of diabetes   e.  Types of diabetes   f. Blood glucose targets in non-pregnant & non-pregnant adults       The participant knows   Their type of diabetes Yes   The basic physiologic defect Yes   Blood glucose targets Yes DATE DSMES TOPIC EVALUATION     2/10/2022 WHAT CAN I EAT?   a. General principles   b. Determining a healthy weight   c. Nutritional terms & tools    Healthy Plate method    Carbohydrate Counting    Reading food labels    Free apps   d. Pregnancy recommendations      The participant    Uses Healthy Plate principles in constructing meals No, need to reinforce next visit   Reads food labels in choosing acceptable foods No; not instructed this visit    The participant would benefit from : reducing portions of rice, beans and tortillas Her tortillas are about 5 inches but thick. DATE DSMES TOPIC EVALUATION     2/10/2022 HOW CAN BLOOD GLUCOSE MONITORING HELP ME?   a. Value of blood glucose monitoring   b. Realistic expectations   c. Blood glucose monitoring targets   d. Target adjustments   e. Setting a1c & blood glucose targets with provider   f. Meter selection    g. Technique for obtaining blood droplet   h. Blood glucose testing sites   i. Determining best times to test   j. Pregnancy recommendations   k. Data sharing with provider        The participant    Can demonstrate their glucometer procedure Yes   Logs their BG readings Yes    The participant would benefit from continue testing and recording on logs provided. [facility] Program for Diabetes Health  Diabetes Self-Management Education & Support Program  Pre-program Assessment    Reason for Referral: GDM  Referral Source: Storm Machado,*  Services requested: Pregnancy DSMES    ASSESSMENT    From my perspective, the participant would benefit from [facility] specifically related to Healthy eating, Monitoring and Physical activity. During the program, we will focus on providing DSMES that specifically addresses participant's interest in Healthy eating, as shown by their reported readiness to change.     The participant would be best served by attending  individual sessions due to language barrier    Diabetes Self-Management Education Follow-up Visit: 22       Clinical Presentation  Christ Carpenter is a 46 y.o.  [facility])  female referred for diabetes self-management education. Participant has GDM for <1 year. Family history negative for diabetes. Patient reports not receiving DSMES services in the past.  Pt is  and due 22  1 hr  mg/dl   3 hour not done -right to DM ed     Most recent A1c value:   Lab Results   Component Value Date/Time    Hemoglobin A1c 5.7 (H) 2022 03:10 PM         Diabetes-related medications:  Current dosing:   Key Antihyperglycemic Medications     Patient is on no antihyperglycemic meds. Blood Pressure Management  Key ACE/ARB Medications     Patient is on no ACE or ARB meds. Lipid Management  Key Antihyperlipidemia Meds     The patient is on no antihyperlipidemia meds. Clot Prevention  Key Anti-Platelet Anticoagulant Meds             aspirin delayed-release 81 mg tablet Take 1 Tablet by mouth daily. Learning Assessment  Learning objectives Educator assessment (2/10/2022)   Diabetes Disease Process  The participant can   A) describe diabetes in basic terms;   B) state the type of diabetes they have; &   C) state accepted blood glucose targets. Healthy Eating  The participant can   A) identify carbohydrate foods; &   B) accurately read food labels. Being Active  The participant can  A) state the benefits of physical activity;  B) report their current PA practices;  C) identify PA they would consider incorporating in their lives; &  D) develop an implementation plan. Monitoring  The participant can  A) operate their blood glucose meter; &  B) describe how they log their blood glucoses to share with their provider. Taking Medications  The participant can  A) name their diabetes medications;  B) state the purpose and dose;  C) note side effects; &  D) describe proper storage, disposal & transport (if appropriate). Healthy Coping  The participant can    A) describe their response to diabetes diagnosis; B) describe their specific coping mechanisms;  C) identify supportive people and/or other resources that positively support their diabetes self-care and health. Reducing Risks  The participant can describe the preventive measures used by providers to promote health and prevent diabetes complications. Problem Solving  The participant can   A) identify signs, symptoms & treatment of hypoglycemia;   B) identify signs, symptoms & treatment of hyperglycemia;  C) describe their sick day plan; &  D) identify BG patterns to discuss with their provider.        Yes  Yes  No        No  No        Yes  Yes  No  No        Yes  Yes          not on diabetes meds            Yes  Yes  Yes        not applicable at this time          No  No  No  No     Characteristics to Learning   Barriers to Learning   [] Cognitive loss  [] Mental retardation   [] Intellectual delay/cognitive impairment  [] Psychiatric disorder  [] Visually impaired  [] Hearing loss                 [] Low literacy (difficulty with written text)  [] Low numeracy (difficulty with mathematical information  [] Low health literacy (difficulty with understanding health information & services  [x] Language  [] Functional limitation  [] Pain   [] Financial  [] Transportation  [] None   Favorite Ways to Learn   [] Lecture  [] Slides  [x] Reading [] Video-Internet  [] Cassettes/CDs/MP3's  [] Interactive Small Groups [] Other       Behavioral Assessment  Current self-care practices  Educator assessment (2/10/2022)   Healthy Eating  Current practices  24-hour Dietary Recall:  Breakfast: 4 cups of chicken soup w/potato, chicken, spices and chayote; 2 thick tortillas  Lunch: same  Dinner: refried beans ~1/2 cup; 2 tortillas  Snacks: whole banana mid morning  Beverages: water  Alcohol: unknown     Would benefit from DSMES related to Healthy Eating: Yes      Eats a carbohydrate controlled diet: No      Stage of change: Preparation   Being Active  Current practices  How many days during the past week have you performed physical activity where your heart beats faster and your breathing is harder than normal for 30 minutes or more?  0 days    How many days in a typical week do you perform activity such as this?  0 days     Would benefit from [facility] related to Being Active: Yes      Exercises 150 minutes/week: No      Stage of change: Contemplation     Monitoring  Current practices  Do you monitor your blood sugar? Yes    How often do you monitor? 4x/day    What are the range of readings?  mg/dL  Fastin-81 mg/dl  1 hour post meals: 127-174 mg/dl    Do you know your last A1c measurement? No    Do you know the meaning of the A1c? No     Would benefit from [facility] related to Monitoring: Yes      Uses BG readings to establish trends and understand BG patterns: No      Stage of change: Action   Taking Medication  Current practices  Do you understand what your diabetes medications do? not applicable    How often do you miss doses of your diabetes medications?  Not taking diabetes medication    Can you afford your diabetes medications? na   Would benefit from [facility] related to Taking Medication: No      Takes medications consistently to receive full benefit: na      Stage of change: deferred till needed       Healthy Coping   Current state     Would benefit from [facility] related to Healthy Coping: No      Identifies specific people, organizations,etc, that actively support their diabetes self-care efforts: Yes      Stage of change: Maintenance     Reducing Risks  Current state  Vaccines:  Influenza:   Immunization History   Administered Date(s) Administered    Influenza Vaccine [employer]) PF (>6 Mo Flulaval, Fluarix, and >3 Rudell Cogan, Fluzone [id number]) 2022           Heart Protection:  BP Readings from Last 2 Encounters:   22 112/77   22 115/73             Would benefit from [facility] related to Reducing Risks: deferred till needed      Actively participates in decision-making with provider regarding secondary prevention:  na      Stage of change: nA   Problem Solving  Current state  Hypoglycemia Management:  What are signs and symptoms of hypoglycemia that you experience? Pt reported being unaware of s/s of hypoglycemia    How do you prevent hypoglycemia? Pt reported being unaware of how to prevent hypoglycemia    How do you treat hypoglycemia? Pt reported being unaware of how to treat hypoglycemia    Hyperglycemia Management:  What are signs and symptoms of hyperglycemia that you experience? Pt reported being unaware of s/s of hyperglycemia    How can you prevent hyperglycemia? Pt reported being unaware of how to prevent hyperglycemia    Sick Day Management:  What do you do differently on sick days? Pt reported being unaware of self-management on sick days    Pattern Management:  Do you notice blood glucose patterns when you look at the readings in your meter or logbook? No    How do you use the blood glucose readings from your meter or logbook? Pt reported being unaware of pattern management skills     Would benefit from [facility] related to Problem Solving: No      Articulates appropriate strategies to address hypoglycemia, hyperglycemia, sick day care and BG pattern: NA AT THIS TIME      Stage of change: deferred till needed     Note: Content derived from the American Association of Diabetes Educators' Diabetes Education Curriculum: A Guide to Successful Self-Management (3rd edition)      Yaya Jacobs RD , [facility] n 2/10/2022 at 9:06 AM    I have personally reviewed the health record, including provider notes, laboratory data and current medications before making these care and education recommendations. The time spent in this effort is included in the total time.   Total minutes: 65

## 2022-02-16 ENCOUNTER — ROUTINE PRENATAL (OUTPATIENT)
Dept: FAMILY MEDICINE CLINIC | Age: 42
End: 2022-02-16

## 2022-02-16 VITALS
TEMPERATURE: 98.2 F | OXYGEN SATURATION: 97 % | BODY MASS INDEX: 38.35 KG/M2 | HEART RATE: 88 BPM | DIASTOLIC BLOOD PRESSURE: 66 MMHG | HEIGHT: 62 IN | SYSTOLIC BLOOD PRESSURE: 106 MMHG | WEIGHT: 208.4 LBS

## 2022-02-16 DIAGNOSIS — Z3A.22 22 WEEKS GESTATION OF PREGNANCY: Primary | ICD-10-CM

## 2022-02-16 PROCEDURE — 0502F SUBSEQUENT PRENATAL CARE: CPT | Performed by: STUDENT IN AN ORGANIZED HEALTH CARE EDUCATION/TRAINING PROGRAM

## 2022-02-16 NOTE — PROGRESS NOTES
: [id number]  Return OB Visit     OB History    Para Term  AB Living   5 2 2 0 2 2   SAB IAB Ectopic Molar Multiple Live Births   2 0 0 0 0 2      # Outcome Date GA Lbr Ananth/2nd Weight Sex Delivery Anes PTL Lv   5 Current            4 2018           3 Term 04    M Vag-Spont   ARTURO   2 Term 00    F Vag-Spont   ARTURO   1 SAB  7w0d            Subjective:   Kely Humphries is a 46 y.o.  at 22w6d by [address] [place] [address]. Estimated Date of Delivery: 22    Concerns today:  Just following up on GDM and to see what the results of her genetic testing was. This has not yet returned. LOF: No  Vaginal bleeding: No  Fetal movement (after 20 weeks): Yes   Contractions: No  Dysuria: No  Headaches, blurred vision, RUQ pain: No  Taking prenatal vitamins: Yes          Allergies   No Known Allergies  Medications:   Current Outpatient Medications   Medication Sig    lancets misc Check blood glucose 4 times daily. In the morning fasting and two hours after each meal    glucose blood VI test strips (ReliOn Prime Test Strips) strip Check blood glucose 4 times daily. In the morning fasting and two hours after each meal    Blood-Glucose Meter (ReliOn Prime Meter) misc Check blood glucose 4 times daily. In the morning fasting and two hours after each meal    aspirin delayed-release 81 mg tablet Take 1 Tablet by mouth daily.  prenatal multivit-ca-min-fe-fa (PRENATAL VITAMIN) tab Take 1 Tab by mouth daily.  acetaminophen (Tylenol Extra Strength) 500 mg tablet Take 500 mg by mouth every six (6) hours as needed for Pain. No current facility-administered medications for this visit. Past Medical History:  No past medical history on file.   Past Surgical History:   Past Surgical History:   Procedure Laterality Date    HX DILATION AND CURETTAGE  10/2018     Social History:  Social History     Tobacco Use    Smoking status: Never Smoker    Smokeless tobacco: Never Used   Substance Use Topics    Alcohol use: Never    Drug use: Never     Immunizations:   Immunization History   Administered Date(s) Administered    Influenza Vaccine [employer] C) PF (>6 Mo Flulaval, Fluarix, and >3 Yrs Afluria, Fluzone 36760) 2022       Objective:   Vital Signs  Visit Vitals  /66 (BP 1 Location: Left upper arm, BP Patient Position: Sitting)   Pulse 88   Temp 98.2 °F (36.8 °C) (Oral)   Ht 5' 2\" (1.575 m)   Wt 208 lb 6.4 oz (94.5 kg)   LMP  (LMP Unknown)   SpO2 97%   BMI 38.12 kg/m²       Physical Exam  See Prenatal Flowsheet    Labs  No results found for this or any previous visit (from the past 12 hour(s)). Erwin Calzada is a 46 y.o.  at 22w6d here for a return OB visit. Estimated Date of Delivery: 22   Plan     - PNL: O+, ab neg, Hg frac wnl, Rubella and VZV immune, HIV neg, Hep C and Hep B nr, RPR nr, A1c 5.7, early 1hr positive, Hgb 11.9  - Genetics: Pending NIPT  - Immunizations: S/p flu  - Ultrasound: 22: Possible clubbed feet, mild L renal pyelectasis at 4mm. Recommend genetic testing. Otherwise nml anatomy but limited views. Repeat scheduled for tomorrow morning to complete anatomy.       2. GDM early 1' GTT positive at 225. Previously discussed risks to mother and baby due to uncontrolled BGs.   - Seen by DM educator with follow up scheduled .  - Continue to keep BG log, review in 2 wks    3. SAB: x2. First at ? 5months (pt reports <20w) and 2nd at 7w (2019) s/p D&C per Dr. Myrtle Cabrera notes. 4. Threatened : 21, evaluated by ED. No bleeding since. 5. AMA: Was not taking ASA  - Start ASA daily    6. Obesity A1c 5.7 and early GTT positive    7.  Late prenatal care: UDS neg      Future Appointments   Date Time Provider Myron Herrera   2022  9:30 AM Apolonia Perez RD [facility] BS AMB   2022  9:00 AM DO MARIBETH Serra BS AMB   3/28/2022 10:30 AM Julio Norris MD [facility] BS AMB       Labor precautions discussed, including: Regular painful contractions, lasting for greater than one hour, taking your breath away; any vaginal bleeding; any leakage of fluid; or absent or decreased fetal movement. Call M.D. on call if any of these symptoms or signs occur. I have discussed the diagnosis with the patient and the intended plan as seen in the above orders. The patient has received an after-visit summary and questions were answered concerning future plans. I have discussed medication side effects and warnings with the patient as well. Informed pt to return to the office or go to the ER if she experiences vaginal bleeding, vaginal discharge, leaking of fluid, pelvic cramping.     Patient discussed with Dr. Sugey Villarreal (Attending Physician)    Elba Correia MD  Family Medicine Resident

## 2022-02-16 NOTE — PROGRESS NOTES
Identified Patient with two Patient identifiers (Name and ). Two Patient Identifiers confirmed. Reviewed record in preparation for visit and have obtained necessary documentation. Patient is 22w6d      LEAKAGE OF FLUID: No  BLEEDING: No  FETAL MOVEMENT: Yes  RAIZ REDDY CONTRACTIONS:No  PRENATAL VITAMINS: Yes  PAIN: No    Chief Complaint   Patient presents with    Routine Prenatal Visit       Visit Vitals  /66 (BP 1 Location: Left upper arm, BP Patient Position: Sitting)   Pulse 88   Temp 98.2 °F (36.8 °C) (Oral)   Ht 5' 2\" (1.575 m)   Wt 208 lb 6.4 oz (94.5 kg)   SpO2 97%   BMI 38.12 kg/m²       1. Have you been to the ER, urgent care clinic since your last visit? Hospitalized since your last visit? No    2. Have you seen or consulted any other health care providers outside of the [address] [place] [address] since your last visit? Include any pap smears or colon screening.  No

## 2022-02-17 ENCOUNTER — HOSPITAL ENCOUNTER (OUTPATIENT)
Dept: PERINATAL CARE | Age: 42
Discharge: HOME | End: 2022-02-17
Attending: OBSTETRICS & GYNECOLOGY

## 2022-02-17 PROCEDURE — 76816 OB US FOLLOW-UP PER FETUS: CPT | Performed by: OBSTETRICS & GYNECOLOGY

## 2022-02-25 ENCOUNTER — CLINICAL SUPPORT (OUTPATIENT)
Dept: DIABETES SERVICES | Age: 42
End: 2022-02-25

## 2022-02-25 DIAGNOSIS — O24.410 DIET CONTROLLED GESTATIONAL DIABETES MELLITUS (GDM) IN SECOND TRIMESTER: Primary | ICD-10-CM

## 2022-02-25 PROCEDURE — G0108 DIAB MANAGE TRN  PER INDIV: HCPCS | Performed by: DIETITIAN, REGISTERED

## 2022-02-25 NOTE — PROGRESS NOTES
[facility] Program for Diabetes Health  Diabetes Self-Management Education & Support Program  Encounter note    SUMMARY  Diabetes self-care management training was completed related to Healthy eating and Monitoring. The participant will return as needed. The participant did not identify SMART Goal(s): - no new goals set, and will practice knowledge and skills related to healthy eating and monitoring and being active to improve diabetes self-management. EVALUATION:  Pt brought all of her BS logs and all numbers in target. She had one low BS but no sx so was asked to recheck if she gets these low numbers. She reports she has significantly cut her portions but still hungry at times. Reviewed she is low on protein at most meals so she could increase this to improve satiety. She is already filling up on non starchy veggies. She is trying to take a walk daily     Date  Fasting  2 hr post breakfast  2 hr post lunch  2 hr post dinner   2/17/22 83 95 110 88   2/18/22 82 113 105 89   2/19/22 87 108 111 115   2/20/22 74 118 120 118   2/21/22 77 108 115 102   2/22/22 92 97 118 115   2/23/22 81 116 118 115   2/24/22 85 98 115 111   2/25/22 76 116       All values are in mg/dl and are obtained by glucometer      RECOMMENDATIONS:  Continue current GDM self care and bring BS to each provider visit   Post partum testing to ensure DM has gone away  Post partum healthy habits to prevent Type 2 DM discussed    Next provider visit is scheduled for 2/28/22       SMART GOAL(S)  1. To reduce portions of carbohydrates at each meal daily to maintain good BS control  ACHIEVEMENT OF GOAL(S)  [] 0-24%     [] 25-49%     [] 50-74%     [x] %             DATE DSMES TOPIC EVALUATION     2/25/2022 WHAT CAN I EAT?   a. General principles   b. Determining a healthy weight   c. Nutritional terms & tools    Healthy Plate method    Carbohydrate Counting    Reading food labels    Free apps   d.  Pregnancy recommendations -after delivery care and prevention of Type 2 diabetes     The participant    Uses Healthy Plate principles in constructing meals Yes   Reads food labels in choosing acceptable foods No    The participant would benefit from: continue to limit her portions . 6 am 2 scr. Eggs, avocado and 1 5 inch tortiilla; coffee-splenda/splash of milk  9 am small banana- small [place] type  12 pm bean soup, 1/3 cup rice,  Hour later medium orange; water  9 pm  1 beef taco w/avocado, cuke, radish on flour tortilla; water           DATE DSMES TOPIC EVALUATION     2/25/2022 HOW CAN BLOOD GLUCOSE MONITORING HELP ME?   a. Value of blood glucose monitoring   b. Realistic expectations   c. Blood glucose monitoring targets   d. Target adjustments   e. Setting a1c & blood glucose targets with provider   f. Meter selection    g. Technique for obtaining blood droplet   h. Blood glucose testing sites   i. Determining best times to test   j. Pregnancy recommendations   k. Data sharing with provider        The participant    Can demonstrate their glucometer procedure Yes   Logs their BG readings Yes    The participant would benefit from ; if she has a snack before she does her 2 hour BS check then make a note on log if BS elevated to reflect a snack is present .          Vahe Greer RD , [facility] on 2/25/2022 at 2:32 PM      Total minutes: 30

## 2022-02-28 ENCOUNTER — ROUTINE PRENATAL (OUTPATIENT)
Dept: FAMILY MEDICINE CLINIC | Age: 42
End: 2022-02-28

## 2022-02-28 DIAGNOSIS — O09.32 LATE PRENATAL CARE AFFECTING PREGNANCY IN SECOND TRIMESTER: ICD-10-CM

## 2022-02-28 DIAGNOSIS — Z87.59 HISTORY OF SPONTANEOUS ABORTION: ICD-10-CM

## 2022-02-28 DIAGNOSIS — O09.522 AMA (ADVANCED MATERNAL AGE) MULTIGRAVIDA 35+, SECOND TRIMESTER: ICD-10-CM

## 2022-02-28 DIAGNOSIS — O24.410 GDM, CLASS A1: ICD-10-CM

## 2022-02-28 DIAGNOSIS — O99.210 MATERNAL OBESITY AFFECTING PREGNANCY, ANTEPARTUM: ICD-10-CM

## 2022-02-28 DIAGNOSIS — Z3A.24 24 WEEKS GESTATION OF PREGNANCY: Primary | ICD-10-CM

## 2022-02-28 PROCEDURE — 0502F SUBSEQUENT PRENATAL CARE: CPT | Performed by: STUDENT IN AN ORGANIZED HEALTH CARE EDUCATION/TRAINING PROGRAM

## 2022-02-28 NOTE — PROGRESS NOTES
Prachi Portillo  46 y.o. female  1980  [address] [facility] [place] [address]  [id number]    [phone] (home)      [address]:    [facility] Telephone Encounter  Isabel Healy       Encounter Date: 2022 at 8:03 AM    Consent:  She and/or the health care decision maker is aware that this encounter will be billed as a routine OB appointment and that she may receive a bill for this telephone service, depending on her insurance coverage, and has provided verbal consent to proceed: Yes    Follow-up Prenatal     Stratus# [id number]  History of Present Illness   Contractions: no  LOF: no  Vaginal bleeding: no  Fetal movement (if >20 wk): yes    The patient has no concerns today. Blood sugars: fasting all <95, 2hr PP <120. Log through  is present in the diabetes education note and is within goal. Per patient all blood sugars have remained at goal since that appt. She has been working on decreasing carbs in her diet. Vitals/Objective:   General: Patient speaking in complete sentences without effort. Normal speech and cooperative. Due to this being a Virtual Check-in/Telephone evaluation, many elements of the physical examination are unable to be assessed. Assessment and Plan:   Prachi Portillo is a 46 y.o.  @ 24w4d c/w 14wk US evaluated by telephone. 1. IUP: O+, ab neg, Hg frac wnl, Hgb 11.9, s/p flu vaccine, NIPT- low risk male, neg carrier screening   - Ultrasound: 22 and 22: Possible clubbed feet, mild L renal pyelectasis at 4mm. Otherwise nml anatomy. Follow up [address] in 4 weeks for fetal growth, scheduled for 3/18. Patient aware   - PNV daily  2. A1GDM: early 1' GTT positive at 225. A1C 5.7, Previously discussed risks to mother and baby due to uncontrolled BGs.  Blood sugars all remain at goal.  - Counseled on diabetic diet   - Seen by DM educator, last appt   - Continue to keep BG log  - Fetal echo due to GDM per MF scheduled for tomorrow at 8am. Patient aware. 3. SAB: x2. First at ? 5months (pt reports <20w) and 2nd at 7w (2019) s/p D&C per Dr. Myrtle Cabrera notes.   4. Threatened : 21, evaluated by ED. No bleeding since. 5. AMA  - ASA daily  6. Obesity A1c 5.7 and early GTT positive  7. Late prenatal care: UDS neg    -Patient informed of her next appointment: 3/28/2022 at 10:30am in clinic  -Advised to come in sooner for any concerns  -Pt informed that after 28 wk she will be asked to do weekly home BP monitoring and it was recommended she buy or borrow a cuff ahead of time. Alternative is going to a grocery store/pharmacy to check. One BP should be checked weekly and written in a log >28 weeks.  -Patient educated on kick counts (after 28 weeks): baby should move 10 times in 2 hours (can be a kick, roll, flutter, swish). -Patient was reminded about social distancing and to avoid going into public when possible. Patient understands that this encounter was a temporary measure, and the importance of further follow up and examination was emphasized. Patient verbalized understanding. I affirm this is a Patient Initiated Episode with an Established Patient who has not had a related appointment within my department in the past 7 days or scheduled within the next 24 hours. Note: not billable if this call serves to triage the patient into an appointment for the relevant concern      Electronically Signed: Iesha Lindsey DO  Providers location when delivering service: home      ICD-10-CM ICD-9-CM    1. 24 weeks gestation of pregnancy  Z3A.24 V22.2    2. GDM, class A1  O24.410 648.80    3. History of spontaneous   Z87.59 V13.29    4. Late prenatal care affecting pregnancy in second trimester  O09.32 V23.7    5. Maternal obesity affecting pregnancy, antepartum  O99.210 649.13    6.  AMA (advanced maternal age) multigravida 33+, second trimester  O09.522 65.56        Pursuant to the emergency declaration under the [employer] and the [address] and the Coronavirus Preparedness and [employer] Act, this Virtual  Visit was conducted, with patient's consent, to reduce the patient's risk of exposure to COVID-19 and provide continuity of care for an established patient. History   Patients past medical, surgical and family histories were personally reviewed and updated. yes    Patient Active Problem List   Diagnosis Code    Maternal obesity affecting pregnancy, antepartum O99.210    Multigravida of advanced maternal age in first trimester O18.65    Late prenatal care affecting pregnancy in second trimester O09.32    History of spontaneous  Z87.59    GDM, class A1 O24.410    Prediabetes R73.03              Current Medications/Allergies   Medications and Allergies reviewed:    Current Outpatient Medications   Medication Sig Dispense Refill    lancets misc Check blood glucose 4 times daily. In the morning fasting and two hours after each meal 1 Each 11    glucose blood VI test strips (ReliOn Prime Test Strips) strip Check blood glucose 4 times daily. In the morning fasting and two hours after each meal 90 Strip 1    Blood-Glucose Meter (ReliOn Prime Meter) misc Check blood glucose 4 times daily. In the morning fasting and two hours after each meal 1 Each 0    acetaminophen (Tylenol Extra Strength) 500 mg tablet Take 500 mg by mouth every six (6) hours as needed for Pain.  aspirin delayed-release 81 mg tablet Take 1 Tablet by mouth daily. 30 Tablet 5    prenatal multivit-ca-min-fe-fa (PRENATAL VITAMIN) tab Take 1 Tab by mouth daily.  90 Tab 4     No Known Allergies

## 2022-03-18 ENCOUNTER — HOSPITAL ENCOUNTER (OUTPATIENT)
Dept: PERINATAL CARE | Age: 42
Discharge: HOME | End: 2022-03-18
Attending: OBSTETRICS & GYNECOLOGY

## 2022-03-18 PROBLEM — O09.32 LATE PRENATAL CARE AFFECTING PREGNANCY IN SECOND TRIMESTER: Status: ACTIVE | Noted: 2022-02-07

## 2022-03-18 PROBLEM — O99.210 MATERNAL OBESITY AFFECTING PREGNANCY, ANTEPARTUM: Status: ACTIVE | Noted: 2022-02-07

## 2022-03-18 PROBLEM — Z87.59 HISTORY OF SPONTANEOUS ABORTION: Status: ACTIVE | Noted: 2022-02-07

## 2022-03-18 PROCEDURE — 76816 OB US FOLLOW-UP PER FETUS: CPT | Performed by: OBSTETRICS & GYNECOLOGY

## 2022-03-19 PROBLEM — R73.03 PREDIABETES: Status: ACTIVE | Noted: 2022-02-07

## 2022-03-19 PROBLEM — O24.410 GDM, CLASS A1: Status: ACTIVE | Noted: 2022-02-07

## 2022-03-20 PROBLEM — O09.521 MULTIGRAVIDA OF ADVANCED MATERNAL AGE IN FIRST TRIMESTER: Status: ACTIVE | Noted: 2022-02-07

## 2022-03-28 ENCOUNTER — ROUTINE PRENATAL (OUTPATIENT)
Dept: FAMILY MEDICINE CLINIC | Age: 42
End: 2022-03-28

## 2022-03-28 VITALS
HEART RATE: 84 BPM | WEIGHT: 208 LBS | OXYGEN SATURATION: 99 % | HEIGHT: 62 IN | DIASTOLIC BLOOD PRESSURE: 62 MMHG | BODY MASS INDEX: 38.28 KG/M2 | RESPIRATION RATE: 17 BRPM | SYSTOLIC BLOOD PRESSURE: 107 MMHG | TEMPERATURE: 97.1 F

## 2022-03-28 DIAGNOSIS — Z3A.28 28 WEEKS GESTATION OF PREGNANCY: Primary | ICD-10-CM

## 2022-03-28 DIAGNOSIS — O24.410 GDM, CLASS A1: ICD-10-CM

## 2022-03-28 LAB
AMPHET UR QL SCN: NEGATIVE
BARBITURATES UR QL SCN: NEGATIVE
BENZODIAZ UR QL: NEGATIVE
CANNABINOIDS UR QL SCN: NEGATIVE
COCAINE UR QL SCN: NEGATIVE
DRUG SCRN COMMENT,DRGCM: NORMAL
ERYTHROCYTE [DISTWIDTH] IN BLOOD BY AUTOMATED COUNT: 13.2 % (ref 11.5–14.5)
HCT VFR BLD AUTO: 36.8 % (ref 35–47)
HGB BLD-MCNC: 11.9 G/DL (ref 11.5–16)
MCH RBC QN AUTO: 29.7 PG (ref 26–34)
MCHC RBC AUTO-ENTMCNC: 32.3 G/DL (ref 30–36.5)
MCV RBC AUTO: 91.8 FL (ref 80–99)
METHADONE UR QL: NEGATIVE
NRBC # BLD: 0 K/UL (ref 0–0.01)
NRBC BLD-RTO: 0 PER 100 WBC
OPIATES UR QL: NEGATIVE
PCP UR QL: NEGATIVE
PLATELET # BLD AUTO: 237 K/UL (ref 150–400)
PMV BLD AUTO: 10.9 FL (ref 8.9–12.9)
RBC # BLD AUTO: 4.01 M/UL (ref 3.8–5.2)
WBC # BLD AUTO: 12.1 K/UL (ref 3.6–11)

## 2022-03-28 PROCEDURE — 90715 TDAP VACCINE 7 YRS/> IM: CPT | Performed by: FAMILY MEDICINE

## 2022-03-28 PROCEDURE — 0502F SUBSEQUENT PRENATAL CARE: CPT | Performed by: FAMILY MEDICINE

## 2022-03-28 NOTE — PROGRESS NOTES
Return OB Visit   [facility]  assisted with this encounter  d/t language barrier      Subjective:   Karuna Brady 46 y.o. G5   JC: 6/16/2022, by Ultrasound  GA:  28w4d. LOF: No   Vaginal bleeding: No   Fetal movement (after 20 weeks): Yes   Contractions: No   Dysuria: No   Headaches, blurred vision, RUQ pain: No   Taking prenatal vitamins: Yes     GDM BG log:       Fasting in 70-80s, Postprandial below 120            Allergies- reviewed:   No Known Allergies  Medications- reviewed:   Current Outpatient Medications   Medication Sig    lancets misc Check blood glucose 4 times daily. In the morning fasting and two hours after each meal    glucose blood VI test strips (ReliOn Prime Test Strips) strip Check blood glucose 4 times daily. In the morning fasting and two hours after each meal    Blood-Glucose Meter (ReliOn Prime Meter) misc Check blood glucose 4 times daily. In the morning fasting and two hours after each meal    acetaminophen (Tylenol Extra Strength) 500 mg tablet Take 500 mg by mouth every six (6) hours as needed for Pain.  aspirin delayed-release 81 mg tablet Take 1 Tablet by mouth daily.  prenatal multivit-ca-min-fe-fa (PRENATAL VITAMIN) tab Take 1 Tab by mouth daily. No current facility-administered medications for this visit. Past Medical History- reviewed:  No past medical history on file.   Past Surgical History- reviewed:   Past Surgical History:   Procedure Laterality Date    HX DILATION AND CURETTAGE  10/2018     Social History- reviewed:  Social History     Socioeconomic History    Marital status:      Spouse name: Not on file    Number of children: Not on file    Years of education: Not on file    Highest education level: Not on file   Occupational History    Not on file   Tobacco Use    Smoking status: Never Smoker    Smokeless tobacco: Never Used   Substance and Sexual Activity    Alcohol use: Never    Drug use: Never    Sexual activity: Yes     Partners: Male     Birth control/protection: None   Other Topics Concern    Not on file   Social History Narrative    Not on file     Social Determinants of Health     Financial Resource Strain:     Difficulty of Paying Living Expenses: Not on file   Food Insecurity:     Worried About Running Out of Food in the Last Year: Not on file    Alexandra of Food in the Last Year: Not on file   Transportation Needs:     Lack of Transportation (Medical): Not on file    Lack of Transportation (Non-Medical): Not on file   Physical Activity:     Days of Exercise per Week: Not on file    Minutes of Exercise per Session: Not on file   Stress:     Feeling of Stress : Not on file   Social Connections:     Frequency of Communication with Friends and Family: Not on file    Frequency of Social Gatherings with Friends and Family: Not on file    Attends Confucianist Services: Not on file    Active Member of [address] [place] [address] [employer] or Organizations: Not on file    Attends Club or Organization Meetings: Not on file    Marital Status: Not on file   Intimate Partner Violence:     Fear of Current or Ex-Partner: Not on file    Emotionally Abused: Not on file    Physically Abused: Not on file    Sexually Abused: Not on file   Housing Stability:     Unable to Pay for Housing in the Last Year: Not on file    Number of [place] in the Last Year: Not on file    Unstable Housing in the Last Year: Not on file     Immunizations- reviewed:   Immunization History   Administered Date(s) Administered    Influenza Vaccine [employer]) PF (>6 Mo Flulaval, Fluarix, and >3 Yrs Afluria, Fluzone 84687) 01/31/2022       Objective:     Visit Vitals  LMP  (LMP Unknown)       Physical Exam:  See OB episode     Labs  No results found for this or any previous visit (from the past 12 hour(s)).       Assessment         ICD-10-CM ICD-9-CM    1. 28 weeks gestation of pregnancy  Z3A.28 V22.2 TETANUS, DIPHTHERIA TOXOIDS AND ACELLULAR PERTUSSIS VACCINE (TDAP), IN INDIVIDS. >=7, IM      [place] IMMUNIZ ADMIN,1 SINGLE/COMB VAC/TOXOID      CBC W/O DIFF      DRUG SCREEN, URINE   2. GDM, class A1  O24.410 648.80          Plan   46 y.o.  28w4d JC 2022, by Ultrasound here for return OB visit     O+, ab neg, Hg frac wnl, Hgb 11.9, s/p flu vaccine, NIPT- low risk male, neg carrier screening   - Ultrasound: 22 and 22: Possible clubbed feet, mild L renal pyelectasis at 4mm. 3/1/22: prenatal ECHO wnl. Otherwise nml anatomy. 3/18/22: EFW 46%, AFV WNL, extremities views limited, mild left fetal renal pyelectasis 0.41, left normal.       1. IUP: PNV daily, 3rd tri CBC and TDap today. Perinatology visit 22 for US at 10.15: pt is aware  2. A1GDM: early 1' GTT positive at 225. A1C 5.7, Previously discussed risks to mother and baby due to uncontrolled BGs. Blood sugars all remain at goal. S/p DE visit. Counseled on diabetic diet. Continue to keep BG log  3. SAB: x2. First at ? 5months (pt reports <20w) and 2nd at 7w (2019) s/p D&C per Dr. Carlos Lazar notes.   4. Threatened : 21, evaluated by ED. No bleeding since. 5. AMA: ASA daily  6. Obesity A1c 5.7 and early GTT positive  7. Late prenatal care: UDS neg, 3rd Tri UDS today  8.  Mild left fetal renal pyelectasis 0.41, left normal: monitor on US  9. ?clubbed feet: monitor on US    BIRTH PLAN:   · Continuity Provider: Bebe Cosme  · Seen by [facility]        Orders Placed This Encounter    [place] IMMUNIZ ADMIN,1 SINGLE/COMB VAC/TOXOID    TETANUS, DIPHTHERIA TOXOIDS AND ACELLULAR PERTUSSIS VACCINE (TDAP), IN INDIVIDS. >=7, IM    CBC W/O DIFF     Standing Status:   Future     Standing Expiration Date:   3/28/2023    DRUG SCREEN, URINE     Standing Status:   Future     Standing Expiration Date:   3/28/2023     Labor precautions discussed, including: Regular painful contractions, lasting for greater than one hour, taking your breath away; any vaginal bleeding; any leakage of fluid; or absent or decreased fetal movement. Call M.D. on call if any of these symptoms or signs occur. I have discussed the diagnosis with the patient and the intended plan as seen in the above orders. The patient has received an after-visit summary and questions were answered concerning future plans. I have discussed medication side effects and warnings with the patient as well. Informed pt to return to the office or go to the ER if she experiences vaginal bleeding, vaginal discharge, leaking of fluid, pelvic cramping.     Pt seen and discussed with Dr. Rashaun Venegas (attending physician)    Tmibo Sandoval MD  Family Medicine Resident

## 2022-03-28 NOTE — PROGRESS NOTES
Chief Complaint   Patient presents with    Routine Prenatal Visit     .  28w 4d today. No c/o.     1. Have you been to the ER, urgent care clinic since your last visit? Hospitalized since your last visit? No    2. Have you seen or consulted any other health care providers outside of the [address] [place] [address] since your last visit? Include any pap smears or colon screening.  No

## 2022-03-28 NOTE — PROGRESS NOTES
I reviewed with the resident the medical history and the resident's findings on the physical examination. I discussed with the resident the patient's diagnosis and concur with the plan. 40yo  @ 28w4d by 14wk scan at [facility]  1. IUP: IOB labs today, pt refuses pap, CBC today   2. AMA: ASA  3.  Obesity: early GTT+A1C   4. Late to care: at 20+ weeks, check UDS   5. Early GDM with preDM: log hard to interpret, but suspect she will need some insulin but she will work on her diet for one more week, discussed risks of uncontrolled glucose including but not limited to macrosomia, birth injury, increased need for , birth defects, miscarriage/fetal demise,  hypoglycemia, persistent diabetes after pregnancy. Log looks good.        Estimated Date of Delivery: 22

## 2022-04-01 ENCOUNTER — HOSPITAL ENCOUNTER (OUTPATIENT)
Dept: PERINATAL CARE | Age: 42
Discharge: HOME | End: 2022-04-01
Attending: OBSTETRICS & GYNECOLOGY

## 2022-04-01 PROCEDURE — 76816 OB US FOLLOW-UP PER FETUS: CPT | Performed by: OBSTETRICS & GYNECOLOGY

## 2022-04-11 ENCOUNTER — ROUTINE PRENATAL (OUTPATIENT)
Dept: FAMILY MEDICINE CLINIC | Age: 42
End: 2022-04-11

## 2022-04-11 VITALS
WEIGHT: 209.2 LBS | RESPIRATION RATE: 16 BRPM | BODY MASS INDEX: 38.5 KG/M2 | DIASTOLIC BLOOD PRESSURE: 66 MMHG | HEIGHT: 62 IN | SYSTOLIC BLOOD PRESSURE: 110 MMHG | OXYGEN SATURATION: 98 % | HEART RATE: 92 BPM

## 2022-04-11 DIAGNOSIS — Z3A.30 30 WEEKS GESTATION OF PREGNANCY: Primary | ICD-10-CM

## 2022-04-11 PROCEDURE — 0502F SUBSEQUENT PRENATAL CARE: CPT | Performed by: FAMILY MEDICINE

## 2022-04-11 NOTE — PROGRESS NOTES
I reviewed with the resident the medical history and the resident's findings on the physical examination. I discussed with the resident the patient's diagnosis and concur with the plan. 40yo  @ 30w4d by 14wk scan at [facility]  1. IUP: IOB labs today, pt refuses pap, CBC today   2. AMA: ASA  3.  Obesity: early GTT+A1C   4. Late to care: at 20+ weeks, check UDS   5. Early GDM with preDM: log hard to interpret, but suspect she will need some insulin but she will work on her diet for one more week, discussed risks of uncontrolled glucose including but not limited to macrosomia, birth injury, increased need for , birth defects, miscarriage/fetal demise,  hypoglycemia, persistent diabetes after pregnancy. Log looks good. 6.  Clubbed feet  7.   Fetal pyelectasis     Seen by [facility]       Estimated Date of Delivery: 22

## 2022-04-11 NOTE — PROGRESS NOTES
Chief Complaint   Patient presents with    Routine Prenatal Visit     30w4d     Visit Vitals  /66 (BP 1 Location: Right arm, BP Patient Position: Sitting, BP Cuff Size: Adult long)   Pulse 92   Resp 16   Ht 5' 2\" (1.575 m)   Wt 209 lb 3.2 oz (94.9 kg)   SpO2 98%   BMI 38.26 kg/m²     Patient is here for a routine prenatal appointment. She is 30 weeks 4 days pregnant. + FM, no vaginal bleeding or leaking of fluid. She brought her BS log today. See PN flowsheet for vitals.

## 2022-04-11 NOTE — PROGRESS NOTES
Return OB Visit     AMN [id number]  Subjective:   Anya García 46 y.o. G5   JC: 6/16/2022, by Ultrasound  GA:  30w4d. LOF: No   Vaginal bleeding: No   Fetal movement (after 20 weeks): Yes   Contractions: No   Dysuria: No   Headaches, blurred vision, RUQ pain: No   Taking prenatal vitamins: Yes     Diabetes log: Allergies- reviewed:   No Known Allergies  Medications- reviewed:   Current Outpatient Medications   Medication Sig    lancets misc Check blood glucose 4 times daily. In the morning fasting and two hours after each meal    glucose blood VI test strips (ReliOn Prime Test Strips) strip Check blood glucose 4 times daily. In the morning fasting and two hours after each meal    Blood-Glucose Meter (ReliOn Prime Meter) misc Check blood glucose 4 times daily. In the morning fasting and two hours after each meal    acetaminophen (Tylenol Extra Strength) 500 mg tablet Take 500 mg by mouth every six (6) hours as needed for Pain.  aspirin delayed-release 81 mg tablet Take 1 Tablet by mouth daily.  prenatal multivit-ca-min-fe-fa (PRENATAL VITAMIN) tab Take 1 Tab by mouth daily. No current facility-administered medications for this visit. Past Medical History- reviewed:  No past medical history on file.   Past Surgical History- reviewed:   Past Surgical History:   Procedure Laterality Date    HX DILATION AND CURETTAGE  10/2018     Social History- reviewed:  Social History     Socioeconomic History    Marital status:      Spouse name: Not on file    Number of children: Not on file    Years of education: Not on file    Highest education level: Not on file   Occupational History    Not on file   Tobacco Use    Smoking status: Never Smoker    Smokeless tobacco: Never Used   Substance and Sexual Activity    Alcohol use: Never    Drug use: Never    Sexual activity: Yes     Partners: Male     Birth control/protection: None   Other Topics Concern    Not on file   Social History Narrative    Not on file     Social Determinants of Health     Financial Resource Strain:     Difficulty of Paying Living Expenses: Not on file   Food Insecurity:     Worried About Running Out of Food in the Last Year: Not on file    Alexandra of Food in the Last Year: Not on file   Transportation Needs:     Lack of Transportation (Medical): Not on file    Lack of Transportation (Non-Medical): Not on file   Physical Activity:     Days of Exercise per Week: Not on file    Minutes of Exercise per Session: Not on file   Stress:     Feeling of Stress : Not on file   Social Connections:     Frequency of Communication with Friends and Family: Not on file    Frequency of Social Gatherings with Friends and Family: Not on file    Attends Adventist Services: Not on file    Active Member of [address] [place] [address] [place] or Organizations: Not on file    Attends Club or Organization Meetings: Not on file    Marital Status: Not on file   Intimate Partner Violence:     Fear of Current or Ex-Partner: Not on file    Emotionally Abused: Not on file    Physically Abused: Not on file    Sexually Abused: Not on file   Housing Stability:     Unable to Pay for Housing in the Last Year: Not on file    Number of [place] in the Last Year: Not on file    Unstable Housing in the Last Year: Not on file     Immunizations- reviewed:   Immunization History   Administered Date(s) Administered    Influenza Vaccine [employer]) PF (>6 Mo Flulaval, Fluarix, and >3 Yrs Afluria, Fluzone 43327) 01/31/2022    Tdap 03/28/2022       Objective:     Visit Vitals  /66 (BP 1 Location: Right arm, BP Patient Position: Sitting, BP Cuff Size: Adult long)   Pulse 92   Resp 16   Ht 5' 2\" (1.575 m)   Wt 209 lb 3.2 oz (94.9 kg)   LMP  (LMP Unknown)   SpO2 98%   BMI 38.26 kg/m²       Physical Exam:  See OB episode     Labs  No results found for this or any previous visit (from the past 12 hour(s)).       Assessment         ICD-10-CM ICD-9-CM    1. 30 weeks gestation of pregnancy  Z3A.30 V22.2          Plan   46 y.o. G5  30w4d JC 2022, by Ultrasound here for return OB visit        O+, ab neg, Hg frac wnl, Hgb 11.9, s/p flu vaccine, NIPT- low risk male, neg carrier screening   - Ultrasound: 22 and 22: Possible clubbed feet, mild L renal pyelectasis at 4mm. 3/1/22: prenatal ECHO wnl. Otherwise nml anatomy. 3/18/22: EFW 46%, AFV WNL, extremities views limited, mild left fetal renal pyelectasis 0.41, left normal. 22: mild left fetal renal pyelectasis today at 0.5 cm (right is WNL). Views of the lower extremities appear clubbed today. AFV appears WNL. Fetal heartrate is intermittently tachycardic to the 180s; patient states that she drank caffeine. Patient failed to go to L&D for NST        1. IUP: PNV daily, Perinatology visit  for repeat US for #8 and #9  2. A1GDM: early 1' GTT positive at 225. A1C 5.7, Previously discussed risks to mother and baby due to uncontrolled BGs. Blood sugars all remain at goal. S/p DE visit. Counseled on diabetic diet. Continue to keep BG log  3. SAB: x2. First at ? 5months (pt reports <20w) and 2nd at 7w (2019) s/p D&C per Dr. Nancy Dunham notes.   4. Threatened : 21, evaluated by ED. No bleeding since. 5. AMA: ASA daily  6. Obesity A1c 5.7 and early GTT positive  7. Late prenatal care: UDS neg, 3rd Tri UDS today  8. Mild left fetal renal pyelectasis 0.41, left normal: monitor on US  9. ?clubbed feet: monitor on US     BIRTH PLAN:   ? Continuity Provider: Lucinda Ventura  ? Seen by  [facility] and Medicaid today      No orders of the defined types were placed in this encounter. Labor precautions discussed, including: Regular painful contractions, lasting for greater than one hour, taking your breath away; any vaginal bleeding; any leakage of fluid; or absent or decreased fetal movement. Call M.D. on call if any of these symptoms or signs occur.     I have discussed the diagnosis with the patient and the intended plan as seen in the above orders. The patient has received an after-visit summary and questions were answered concerning future plans. I have discussed medication side effects and warnings with the patient as well. Informed pt to return to the office or go to the ER if she experiences vaginal bleeding, vaginal discharge, leaking of fluid, pelvic cramping.     Pt seen and discussed with Dr. Wynne Schirmer (attending physician)    Brenden Ahmadi MD  Family Medicine Resident

## 2022-04-19 ENCOUNTER — HOSPITAL ENCOUNTER (OUTPATIENT)
Dept: PERINATAL CARE | Age: 42
Discharge: HOME | End: 2022-04-19
Attending: OBSTETRICS & GYNECOLOGY

## 2022-04-19 PROCEDURE — 76816 OB US FOLLOW-UP PER FETUS: CPT | Performed by: OBSTETRICS & GYNECOLOGY

## 2022-04-19 PROCEDURE — 76820 UMBILICAL ARTERY ECHO: CPT | Performed by: OBSTETRICS & GYNECOLOGY

## 2022-04-19 PROCEDURE — 76819 FETAL BIOPHYS PROFIL W/O NST: CPT | Performed by: OBSTETRICS & GYNECOLOGY

## 2022-04-25 ENCOUNTER — ROUTINE PRENATAL (OUTPATIENT)
Dept: FAMILY MEDICINE CLINIC | Age: 42
End: 2022-04-25

## 2022-04-25 VITALS
BODY MASS INDEX: 38.5 KG/M2 | RESPIRATION RATE: 16 BRPM | WEIGHT: 209.2 LBS | SYSTOLIC BLOOD PRESSURE: 101 MMHG | HEIGHT: 62 IN | OXYGEN SATURATION: 99 % | HEART RATE: 91 BPM | TEMPERATURE: 97.5 F | DIASTOLIC BLOOD PRESSURE: 67 MMHG

## 2022-04-25 DIAGNOSIS — O99.210 MATERNAL OBESITY AFFECTING PREGNANCY, ANTEPARTUM: ICD-10-CM

## 2022-04-25 DIAGNOSIS — R73.03 PREDIABETES: ICD-10-CM

## 2022-04-25 DIAGNOSIS — Z3A.32 32 WEEKS GESTATION OF PREGNANCY: Primary | ICD-10-CM

## 2022-04-25 DIAGNOSIS — R30.9 URINARY PAIN: ICD-10-CM

## 2022-04-25 DIAGNOSIS — Z87.59 HISTORY OF SPONTANEOUS ABORTION: ICD-10-CM

## 2022-04-25 DIAGNOSIS — O24.410 GDM, CLASS A1: ICD-10-CM

## 2022-04-25 LAB
BILIRUB UR QL STRIP: NEGATIVE
GLUCOSE UR-MCNC: NEGATIVE MG/DL
KETONES P FAST UR STRIP-MCNC: NORMAL MG/DL
PH UR STRIP: 5.5 [PH] (ref 4.6–8)
PROT UR QL STRIP: NORMAL
SP GR UR STRIP: 1.03 (ref 1–1.03)
UA UROBILINOGEN AMB POC: NORMAL (ref 0.2–1)
URINALYSIS CLARITY POC: NORMAL
URINALYSIS COLOR POC: YELLOW
URINE BLOOD POC: NEGATIVE
URINE LEUKOCYTES POC: NORMAL
URINE NITRITES POC: NEGATIVE

## 2022-04-25 PROCEDURE — 81003 URINALYSIS AUTO W/O SCOPE: CPT | Performed by: STUDENT IN AN ORGANIZED HEALTH CARE EDUCATION/TRAINING PROGRAM

## 2022-04-25 PROCEDURE — 0502F SUBSEQUENT PRENATAL CARE: CPT | Performed by: STUDENT IN AN ORGANIZED HEALTH CARE EDUCATION/TRAINING PROGRAM

## 2022-04-25 NOTE — PATIENT INSTRUCTIONS
Weeks 30 to 32 of Your Pregnancy: Care Instructions  Overview     You've made it to the final months of your pregnancy! By now your baby is really starting to look like a baby, with hair and plump skin. As you enter the final weeks of pregnancy, the reality of having a baby may start to set in. This is a good time to set up a safe nursery and find quality  if needed. Doing this stuff ahead of time will allow you to focus on caring for and enjoying your new baby. You may also want to take a tour of your hospital's labor and delivery unit. This will help you get a better idea of what to expect while you're in the hospital.  During these last months, be sure to take good care of yourself. Pay attention to what your body needs. If your doctor says it's okay for you to work, don't push yourself too hard. If you haven't already had the Tdap shot during this pregnancy, talk to your doctor about getting it. It will help protect your  against pertussis infection. Follow-up care is a key part of your treatment and safety. Be sure to make and go to all appointments, and call your doctor if you are having problems. It's also a good idea to know your test results and keep a list of the medicines you take. How can you care for yourself at home? Pay attention to your baby's movements  · You should feel your baby move several times every day. · Your baby now turns less, and kicks and jabs more. · Your baby sleeps 20 to 45 minutes at a time and is more active at certain times of day. · If your doctor wants you to count your baby's kicks:  ? Empty your bladder, and lie on your side or relax in a comfortable chair. ? Write down your start time. ? Pay attention only to your baby's movements. Count any movement except hiccups. ? After you have counted 10 movements, write down your stop time. ? Write down how many minutes it took for your baby to move 10 times.   ? If an hour goes by and you have not recorded 10 movements, have something to eat or drink and then count for another hour. If you don't record at least 10 movements in the 2-hour period, call your doctor. Ease heartburn  · Eat small, frequent meals. · Do not eat chocolate, peppermint, or very spicy foods. Avoid drinks with caffeine, such as coffee, tea, and sodas. · Avoid bending over or lying down after meals. · Take a short walk after you eat. · If heartburn is a problem at night, do not eat for 2 hours before bedtime. · Take antacids like Mylanta, Maalox, Rolaids, or Tums. Do not take antacids that have sodium bicarbonate. Care for varicose veins  · Varicose veins are blood vessels that stretch out with the extra blood during pregnancy. Your legs may ache or throb. Most varicose veins will go away after the birth. · Avoid standing for long periods of time. Sit with your legs crossed at the ankles, not the knees. · Sit with your feet propped up. · Avoid tight clothing or stockings. Wear support hose. · Exercise regularly. Try walking for at least 30 minutes a day. Where can you learn more? Go to http://www.gray.com/  Enter X471 in the search box to learn more about \"Weeks 30 to 32 of Your Pregnancy: Care Instructions. \"  Current as of: June 16, 2021               Content Version: 13.2  © 9568-1062 [employer]. Care instructions adapted under license by [employer] (which disclaims liability or warranty for this information). If you have questions about a medical condition or this instruction, always ask your healthcare professional. [place] [address] any warranty or liability for your use of this information.

## 2022-04-25 NOTE — PROGRESS NOTES
I reviewed with the resident the medical history and the resident's findings on the physical examination. I discussed with the resident the patient's diagnosis and concur with the plan. Log looks good     42yo  @ 32w4d by 14wk scan at [facility]  1. IUP: IOB labs today, pt refuses pap, CBC today   2. AMA: ASA  3.  Obesity  4. Late to care: at 20+ weeks, UDS neg    5. Early GDM with preDM: log hard to interpret, but suspect she will need some insulin but she will work on her diet for one more week, discussed risks of uncontrolled glucose including but not limited to macrosomia, birth injury, increased need for , birth defects, miscarriage/fetal demise,  hypoglycemia, persistent diabetes after pregnancy. Log looks good. 6.  Growth restriction, clubbed feet: NIPT low risk, has f/up on   7.   Fetal pyelectasis     Seen by [facility]   Estimated Date of Delivery: 22

## 2022-04-25 NOTE — PROGRESS NOTES
Return OB Visit     Aleah# [id number]  Subjective:   Guy Edmond 46 y.o. G5   JC: 6/16/2022, by Ultrasound  GA:  32w4d. LOF: none  Vaginal bleeding: none  Fetal movement (after 20 weeks): yes  Contractions: infrequent, sporadic    Her only concern today is intermittent dysuria. This has been going on for 3 weeks but only concerns 1-2 times per week. Regarding her diabetes- she has been working on portion control and eating a healthier diet. Her blood sugars are below: Allergies- reviewed:   No Known Allergies  Medications- reviewed:   Current Outpatient Medications   Medication Sig    lancets misc Check blood glucose 4 times daily. In the morning fasting and two hours after each meal    glucose blood VI test strips (ReliOn Prime Test Strips) strip Check blood glucose 4 times daily. In the morning fasting and two hours after each meal    Blood-Glucose Meter (ReliOn Prime Meter) misc Check blood glucose 4 times daily. In the morning fasting and two hours after each meal    acetaminophen (Tylenol Extra Strength) 500 mg tablet Take 500 mg by mouth every six (6) hours as needed for Pain.  aspirin delayed-release 81 mg tablet Take 1 Tablet by mouth daily.  prenatal multivit-ca-min-fe-fa (PRENATAL VITAMIN) tab Take 1 Tab by mouth daily. No current facility-administered medications for this visit. Past Medical History- reviewed:  No past medical history on file.   Past Surgical History- reviewed:   Past Surgical History:   Procedure Laterality Date    HX DILATION AND CURETTAGE  10/2018     Social History- reviewed:  Social History     Socioeconomic History    Marital status:      Spouse name: Not on file    Number of children: Not on file    Years of education: Not on file    Highest education level: Not on file   Occupational History    Not on file   Tobacco Use    Smoking status: Never Smoker    Smokeless tobacco: Never Used   Substance and Sexual Activity    Alcohol use: Never    Drug use: Never    Sexual activity: Yes     Partners: Male     Birth control/protection: None   Other Topics Concern    Not on file   Social History Narrative    Not on file     Social Determinants of Health     Financial Resource Strain:     Difficulty of Paying Living Expenses: Not on file   Food Insecurity:     Worried About Running Out of Food in the Last Year: Not on file    Alexandra of Food in the Last Year: Not on file   Transportation Needs:     Lack of Transportation (Medical): Not on file    Lack of Transportation (Non-Medical):  Not on file   Physical Activity:     Days of Exercise per Week: Not on file    Minutes of Exercise per Session: Not on file   Stress:     Feeling of Stress : Not on file   Social Connections:     Frequency of Communication with Friends and Family: Not on file    Frequency of Social Gatherings with Friends and Family: Not on file    Attends Hinduism Services: Not on file    Active Member of [address] [place] [address] or Organizations: Not on file    Attends Club or Organization Meetings: Not on file    Marital Status: Not on file   Intimate Partner Violence:     Fear of Current or Ex-Partner: Not on file    Emotionally Abused: Not on file    Physically Abused: Not on file    Sexually Abused: Not on file   Housing Stability:     Unable to Pay for Housing in the Last Year: Not on file    Number of [place] in the Last Year: Not on file    Unstable Housing in the Last Year: Not on file     Immunizations- reviewed:   Immunization History   Administered Date(s) Administered    Influenza Vaccine [employer]) PF (>6 Mo Flulaval, Fluarix, and >3 Yrs Afluria, Fluzone 35270) 01/31/2022    Tdap 03/28/2022       Objective:     Visit Vitals  /67 (BP 1 Location: Left upper arm, BP Patient Position: Sitting, BP Cuff Size: Adult)   Pulse 91   Temp 97.5 °F (36.4 °C) (Temporal)   Resp 16   Ht 5' 2\" (1.575 m)   Wt 209 lb 3.2 oz (94.9 kg)   LMP  (LMP Unknown)   SpO2 99% BMI 38.26 kg/m²       Physical Exam:  GENERAL APPEARANCE: alert, well appearing, in no apparent distress  ABDOMEN: gravid, fundal height 32 cm, FHT present at 145 bpm  PSYCH: normal mood and affect    Labs  Recent Results (from the past 12 hour(s))   AMB POC URINALYSIS DIP STICK AUTO W/O MICRO    Collection Time: 22  8:50 AM   Result Value Ref Range    Color (UA POC) Yellow     Clarity (UA POC) Slightly Cloudy     Glucose (UA POC) Negative Negative    Bilirubin (UA POC) Negative Negative    Ketones (UA POC) Trace Negative    Specific gravity (UA POC) 1.030 1.001 - 1.035    Blood (UA POC) Negative Negative    pH (UA POC) 5.5 4.6 - 8.0    Protein (UA POC) 1+ Negative    Urobilinogen (UA POC) 0.2 mg/dL 0.2 - 1    Nitrites (UA POC) Negative Negative    Leukocyte esterase (UA POC) Trace Negative         Assessment         ICD-10-CM ICD-9-CM    1. 32 weeks gestation of pregnancy  Z3A.32 V22.2    2. Urinary pain  R30.9 788.1 AMB POC URINALYSIS DIP STICK AUTO W/O MICRO      CULTURE, URINE   3. History of spontaneous   Z87.59 V13.29    4. Maternal obesity affecting pregnancy, antepartum  O99.210 649.13    5. GDM, class A1  O24.410 648.80    6. Prediabetes  R73.03 790.29          Plan   46 y.o. G5  32w4d JC 2022, by Ultrasound here for return OB visit      1. IUP: O+, ab neg, Hg frac wnl, Hgb 11.9, s/p flu vaccine, NIPT- low risk male, neg carrier screening  - PNV daily  2. A1GDM: early 1' GTT positive at 225. A1C 5.7, Previously discussed risks to mother and baby due to uncontrolled BGs. Blood sugars all remain at goal. S/p DE visit.   - Counseled on diabetic diet and continue to keep BG log  3. Asymmetric growth restriction: Noted on  US, AC & FL <5th%, fetal movement and fluid volume appear normal, NIPT low risk  -  testing  with MFM, patient aware  4. Possible clubbed feet: On US, last US on  lower extremities were not well visualized.    -  testing  with MFM, patient aware  5. Mild left fetal renal pyelectasis: Resolved on  US  6. Simple ovarian cyst: [id number], torsion precautions were reviewed by MFM  7. SAB: x2. First at ? 5months (pt reports <20w) and 2nd at 7w (2019) s/p D&C per Dr. Anastacio Ortiz notes.   8. Threatened : 21, evaluated by ED. No bleeding since. 9. AMA: ASA daily  10. Late prenatal care: 1st and 3rd tri UDS neg      Orders Placed This Encounter    CULTURE, URINE     Standing Status:   Future     Standing Expiration Date:   2023    AMB POC URINALYSIS DIP STICK AUTO W/O MICRO     Labor precautions discussed, including: Regular painful contractions, lasting for greater than one hour, taking your breath away; any vaginal bleeding; any leakage of fluid; or absent or decreased fetal movement. Call M.D. on call if any of these symptoms or signs occur. I have discussed the diagnosis with the patient and the intended plan as seen in the above orders. The patient has received an after-visit summary and questions were answered concerning future plans. I have discussed medication side effects and warnings with the patient as well. Informed pt to return to the office or go to the ER if she experiences vaginal bleeding, vaginal discharge, leaking of fluid, pelvic cramping.     Patient discussed with Dr. Johanna Rivers (attending physician)    Tonja Castillo DO  Family Medicine Resident

## 2022-04-26 LAB
BACTERIA SPEC CULT: NORMAL
SERVICE CMNT-IMP: NORMAL

## 2022-04-29 ENCOUNTER — HOSPITAL ENCOUNTER (OUTPATIENT)
Dept: PERINATAL CARE | Age: 42
Discharge: HOME | End: 2022-04-29
Attending: OBSTETRICS & GYNECOLOGY

## 2022-04-29 PROCEDURE — 76819 FETAL BIOPHYS PROFIL W/O NST: CPT | Performed by: OBSTETRICS & GYNECOLOGY

## 2022-05-06 ENCOUNTER — HOSPITAL ENCOUNTER (OUTPATIENT)
Dept: PERINATAL CARE | Age: 42
Discharge: HOME | End: 2022-05-06
Attending: OBSTETRICS & GYNECOLOGY

## 2022-05-06 PROCEDURE — 76820 UMBILICAL ARTERY ECHO: CPT | Performed by: OBSTETRICS & GYNECOLOGY

## 2022-05-06 PROCEDURE — 76819 FETAL BIOPHYS PROFIL W/O NST: CPT | Performed by: OBSTETRICS & GYNECOLOGY

## 2022-05-09 ENCOUNTER — ROUTINE PRENATAL (OUTPATIENT)
Dept: FAMILY MEDICINE CLINIC | Age: 42
End: 2022-05-09

## 2022-05-09 VITALS
WEIGHT: 212 LBS | OXYGEN SATURATION: 96 % | DIASTOLIC BLOOD PRESSURE: 75 MMHG | SYSTOLIC BLOOD PRESSURE: 122 MMHG | BODY MASS INDEX: 39.01 KG/M2 | HEIGHT: 62 IN | HEART RATE: 81 BPM | RESPIRATION RATE: 16 BRPM

## 2022-05-09 DIAGNOSIS — O09.33 LATE PRENATAL CARE AFFECTING PREGNANCY IN THIRD TRIMESTER: ICD-10-CM

## 2022-05-09 DIAGNOSIS — O99.210 MATERNAL OBESITY AFFECTING PREGNANCY, ANTEPARTUM: ICD-10-CM

## 2022-05-09 DIAGNOSIS — R73.03 PREDIABETES: ICD-10-CM

## 2022-05-09 DIAGNOSIS — Z3A.34 34 WEEKS GESTATION OF PREGNANCY: Primary | ICD-10-CM

## 2022-05-09 PROBLEM — O09.523 MULTIGRAVIDA OF ADVANCED MATERNAL AGE IN THIRD TRIMESTER: Status: ACTIVE | Noted: 2022-02-07

## 2022-05-09 PROCEDURE — 0502F SUBSEQUENT PRENATAL CARE: CPT | Performed by: STUDENT IN AN ORGANIZED HEALTH CARE EDUCATION/TRAINING PROGRAM

## 2022-05-09 NOTE — PROGRESS NOTES
Return OB Visit       Subjective:   Juan A Still 46 y.o. G5   JC: 6/16/2022, by Ultrasound  GA:  34w4d. LOF: none  Vaginal bleeding: none  Fetal movement (after 20 weeks): yes  Contractions: infrequent, sporadic     She has no concerns today. Blood sugars: Ran out of paper so she has not been writing down her values recently, but she reports all fasting blood sugars 70-80s and all 2hr PPs 100-117. She has been working on eating a healthy diet. Allergies- reviewed:   No Known Allergies  Medications- reviewed:   Current Outpatient Medications   Medication Sig    lancets misc Check blood glucose 4 times daily. In the morning fasting and two hours after each meal    glucose blood VI test strips (ReliOn Prime Test Strips) strip Check blood glucose 4 times daily. In the morning fasting and two hours after each meal    Blood-Glucose Meter (ReliOn Prime Meter) misc Check blood glucose 4 times daily. In the morning fasting and two hours after each meal    prenatal multivit-ca-min-fe-fa (PRENATAL VITAMIN) tab Take 1 Tab by mouth daily.  acetaminophen (Tylenol Extra Strength) 500 mg tablet Take 500 mg by mouth every six (6) hours as needed for Pain.  aspirin delayed-release 81 mg tablet Take 1 Tablet by mouth daily. No current facility-administered medications for this visit. Past Medical History- reviewed:  No past medical history on file.   Past Surgical History- reviewed:   Past Surgical History:   Procedure Laterality Date    HX DILATION AND CURETTAGE  10/2018     Social History- reviewed:  Social History     Socioeconomic History    Marital status:      Spouse name: Not on file    Number of children: Not on file    Years of education: Not on file    Highest education level: Not on file   Occupational History    Not on file   Tobacco Use    Smoking status: Never Smoker    Smokeless tobacco: Never Used   Substance and Sexual Activity    Alcohol use: Never    Drug use: Never    Sexual activity: Yes     Partners: Male     Birth control/protection: None   Other Topics Concern    Not on file   Social History Narrative    Not on file     Social Determinants of Health     Financial Resource Strain:     Difficulty of Paying Living Expenses: Not on file   Food Insecurity:     Worried About Running Out of Food in the Last Year: Not on file    Alexandra of Food in the Last Year: Not on file   Transportation Needs:     Lack of Transportation (Medical): Not on file    Lack of Transportation (Non-Medical):  Not on file   Physical Activity:     Days of Exercise per Week: Not on file    Minutes of Exercise per Session: Not on file   Stress:     Feeling of Stress : Not on file   Social Connections:     Frequency of Communication with Friends and Family: Not on file    Frequency of Social Gatherings with Friends and Family: Not on file    Attends Adventism Services: Not on file    Active Member of [address] [place] [address] Current Communications Group or Organizations: Not on file    Attends Club or Organization Meetings: Not on file    Marital Status: Not on file   Intimate Partner Violence:     Fear of Current or Ex-Partner: Not on file    Emotionally Abused: Not on file    Physically Abused: Not on file    Sexually Abused: Not on file   Housing Stability:     Unable to Pay for Housing in the Last Year: Not on file    Number of [place] in the Last Year: Not on file    Unstable Housing in the Last Year: Not on file     Immunizations- reviewed:   Immunization History   Administered Date(s) Administered    Influenza Vaccine [employer]) PF (>6 Mo Flulaval, Fluarix, and >3 Yrs Afluria, Fluzone 58680) 01/31/2022    Tdap 03/28/2022     Objective:     Visit Vitals  /75 (BP 1 Location: Right arm, BP Patient Position: Sitting)   Pulse 81   Resp 16   Ht 5' 2\" (1.575 m)   Wt 212 lb (96.2 kg)   LMP  (LMP Unknown)   SpO2 96%   BMI 38.78 kg/m²       Physical Exam:  GENERAL APPEARANCE: alert, well appearing, in no apparent distress  ABDOMEN: gravid, fundal height 36 cm, FHT present at 140 bpm  PSYCH: normal mood and affect    Labs  No results found for this or any previous visit (from the past 12 hour(s)). Assessment       ICD-10-CM ICD-9-CM    1. 34 weeks gestation of pregnancy  Z3A.34 V22.2    2. Late prenatal care affecting pregnancy in third trimester  O09.33 V23.7    3. Prediabetes  R73.03 790.29    4. Maternal obesity affecting pregnancy, antepartum  O99.210 649.13        Plan   46 y.o. G5  34w4d JC 2022, by Ultrasound here for return OB visit     1. IUP: O+, ab neg, Hg frac wnl, Hgb 11.9, s/p flu vaccine, NIPT- low risk male, neg carrier screening  - PNV daily  2. A1GDM: early 1' GTT positive at 225. A1C 5.7, Previously discussed risks to mother and baby due to uncontrolled BGs. Blood sugars all remain at goal. S/p DE visit.   - Counseled on diabetic diet and continue to keep BG log. Given more BG log sheets. 3. Asymmetric growth restriction: Noted on  US, AC & FL <5th%, fetal movement and fluid volume appear normal, NIPT low risk  -  testing weekly with MFM, last BPP done on  and   4. Possible clubbed feet: On US, last US on  lower extremities were not well visualized. 5. Mild left fetal renal pyelectasis: Resolved on  US  6. Simple ovarian cyst: [id number], torsion precautions were reviewed by MFM  7. SAB: x2. First at ? 5months (pt reports <20w) and 2nd at 7w (2019) s/p D&C per Dr. Tobin Bernheim notes.   8. Threatened : 21, evaluated by ED. No bleeding since. 9. AMA: ASA daily  10. Late prenatal care: 1st and 3rd tri UDS neg        No orders of the defined types were placed in this encounter. Labor precautions discussed, including: Regular painful contractions, lasting for greater than one hour, taking your breath away; any vaginal bleeding; any leakage of fluid; or absent or decreased fetal movement. Call M.D. on call if any of these symptoms or signs occur.     I have discussed the diagnosis with the patient and the intended plan as seen in the above orders. The patient has received an after-visit summary and questions were answered concerning future plans. I have discussed medication side effects and warnings with the patient as well. Informed pt to return to the office or go to the ER if she experiences vaginal bleeding, vaginal discharge, leaking of fluid, pelvic cramping.     Patient discussed with Dr. Janice Macdonald (attending physician)    Heena Kidd DO  Family Medicine Resident

## 2022-05-09 NOTE — PROGRESS NOTES
Chief Complaint   Patient presents with    Routine Prenatal Visit     34 week 4day        Patient identified with 2 patient identifiers (name and D. O. B)    Patient is a  at 34w4d    Leakage of Fluid: NO  Vaginal Bleeding: NO  Fetal Movement if > 20 weeks: YES  Prenatal vitamins: YES  Having Contractions: NO  Pain: NO    Visit Vitals  /75 (BP 1 Location: Right arm, BP Patient Position: Sitting)   Pulse 81   Resp 16   Ht 5' 2\" (1.575 m)   Wt 212 lb (96.2 kg)   LMP  (LMP Unknown)   SpO2 96%   BMI 38.78 kg/m²       Immunization History   Administered Date(s) Administered    Influenza Vaccine [employer]) PF (>6 Mo Flulaval, Fluarix, and >3 Yrs Afluria, Fluzone 79049) 2022    Tdap 2022       1. Have you been to the ER, urgent care clinic since your last visit? Hospitalized since your last visit? No    2. Have you seen or consulted any other health care providers outside of the [address] [place] [address] since your last visit? Include any pap smears or colon screening.  No

## 2022-05-13 ENCOUNTER — HOSPITAL ENCOUNTER (OUTPATIENT)
Dept: PERINATAL CARE | Age: 42
Discharge: HOME | End: 2022-05-13
Attending: OBSTETRICS & GYNECOLOGY

## 2022-05-13 PROCEDURE — 76819 FETAL BIOPHYS PROFIL W/O NST: CPT | Performed by: OBSTETRICS & GYNECOLOGY

## 2022-05-13 PROCEDURE — 76816 OB US FOLLOW-UP PER FETUS: CPT | Performed by: OBSTETRICS & GYNECOLOGY

## 2022-05-17 NOTE — PROGRESS NOTES
Estimated Date of Delivery: 6/16/22  EFW 21st%  AC and FL <5th%  Consistent with growth restriction  Pyelectasis resolved  Ovarian cyst  Fetal echo normal  Follow up one week

## 2022-05-17 NOTE — PROGRESS NOTES
Estimated Date of Delivery: 6/16/22  Referred for echo for GDM  Bilateral clubbed feet  Follow up 4 weeks

## 2022-05-23 ENCOUNTER — ROUTINE PRENATAL (OUTPATIENT)
Dept: FAMILY MEDICINE CLINIC | Age: 42
End: 2022-05-23

## 2022-05-23 VITALS
WEIGHT: 218 LBS | OXYGEN SATURATION: 98 % | DIASTOLIC BLOOD PRESSURE: 73 MMHG | HEIGHT: 62 IN | BODY MASS INDEX: 40.12 KG/M2 | RESPIRATION RATE: 17 BRPM | SYSTOLIC BLOOD PRESSURE: 119 MMHG | HEART RATE: 79 BPM

## 2022-05-23 DIAGNOSIS — O09.523 MULTIGRAVIDA OF ADVANCED MATERNAL AGE IN THIRD TRIMESTER: ICD-10-CM

## 2022-05-23 DIAGNOSIS — O09.33 LATE PRENATAL CARE AFFECTING PREGNANCY IN THIRD TRIMESTER: ICD-10-CM

## 2022-05-23 DIAGNOSIS — R73.03 PREDIABETES: ICD-10-CM

## 2022-05-23 DIAGNOSIS — Z3A.36 36 WEEKS GESTATION OF PREGNANCY: Primary | ICD-10-CM

## 2022-05-23 DIAGNOSIS — O24.410 GDM, CLASS A1: ICD-10-CM

## 2022-05-23 DIAGNOSIS — O99.210 MATERNAL OBESITY AFFECTING PREGNANCY, ANTEPARTUM: ICD-10-CM

## 2022-05-23 DIAGNOSIS — Z87.59 HISTORY OF SPONTANEOUS ABORTION: ICD-10-CM

## 2022-05-23 PROCEDURE — 0502F SUBSEQUENT PRENATAL CARE: CPT | Performed by: STUDENT IN AN ORGANIZED HEALTH CARE EDUCATION/TRAINING PROGRAM

## 2022-05-23 NOTE — PROGRESS NOTES
I reviewed with the resident the medical history and the resident's findings on the physical examination. I discussed with the resident the patient's diagnosis and concur with the plan. 40yo  @ 36w4d by 14wk scan at [facility]  1. IUP: RH pos, pt refuses pap, CBC today, GBS collected  2. AMA: ASA  3.  Obesity: early GTT+A1C   4. Late to care: at 20+ weeks, UDS neg    5. Early GDM with preDM: log all at goal, discussed risks of uncontrolled glucose including but not limited to macrosomia, birth injury, increased need for , birth defects, miscarriage/fetal demise,  hypoglycemia, persistent diabetes after pregnancy. EFW 32nd% on   6. Clubbed feet  7.   Fetal pyelectasis     Seen by [facility]       Estimated Date of Delivery: 22

## 2022-05-23 NOTE — PROGRESS NOTES
Chief Complaint   Patient presents with    Routine Prenatal Visit     36 week 4 day       Patient identified with 2 patient identifiers (name and D. O. B)    Patient is a  at 36w4d    Leakage of Fluid: NO  Vaginal Bleeding: NO  Fetal Movement if > 20 weeks: YES  Prenatal vitamins: YES  Having Contractions: NO  Pain: NO    Visit Vitals  /73 (BP 1 Location: Right arm, BP Patient Position: Sitting)   Pulse 79   Resp 17   Ht 5' 2\" (1.575 m)   Wt 218 lb (98.9 kg)   LMP  (LMP Unknown)   SpO2 98%   BMI 39.87 kg/m²       Immunization History   Administered Date(s) Administered    Influenza Vaccine [employer]) PF (>6 Mo Flulaval, Fluarix, and >3 Yrs Afluria, Fluzone 49508) 2022    Tdap 2022       1. Have you been to the ER, urgent care clinic since your last visit? Hospitalized since your last visit? No    2. Have you seen or consulted any other health care providers outside of the [address] [place] [address] since your last visit? Include any pap smears or colon screening.  No

## 2022-05-23 NOTE — PROGRESS NOTES
Return OB Visit     AMN # [id number] used due to language barrier  Subjective:   Bassam Taylor 46 y.o. G5   JC: 6/16/2022, by Ultrasound  GA:  36w4d. LOF: none  Vaginal bleeding: none  Fetal movement (after 20 weeks): yes  Contractions: none    The patient has no concerns today. Blood sugars: All fastings <95, all 2hr PP <120               Allergies- reviewed:   No Known Allergies  Medications- reviewed:   Current Outpatient Medications   Medication Sig    lancets misc Check blood glucose 4 times daily. In the morning fasting and two hours after each meal    glucose blood VI test strips (ReliOn Prime Test Strips) strip Check blood glucose 4 times daily. In the morning fasting and two hours after each meal    Blood-Glucose Meter (ReliOn Prime Meter) misc Check blood glucose 4 times daily. In the morning fasting and two hours after each meal    acetaminophen (Tylenol Extra Strength) 500 mg tablet Take 500 mg by mouth every six (6) hours as needed for Pain.  aspirin delayed-release 81 mg tablet Take 1 Tablet by mouth daily.  prenatal multivit-ca-min-fe-fa (PRENATAL VITAMIN) tab Take 1 Tab by mouth daily. No current facility-administered medications for this visit. Past Medical History- reviewed:  No past medical history on file.   Past Surgical History- reviewed:   Past Surgical History:   Procedure Laterality Date    HX DILATION AND CURETTAGE  10/2018     Social History- reviewed:  Social History     Socioeconomic History    Marital status:      Spouse name: Not on file    Number of children: Not on file    Years of education: Not on file    Highest education level: Not on file   Occupational History    Not on file   Tobacco Use    Smoking status: Never Smoker    Smokeless tobacco: Never Used   Substance and Sexual Activity    Alcohol use: Never    Drug use: Never    Sexual activity: Yes     Partners: Male     Birth control/protection: None   Other Topics Concern    Not on file   Social History Narrative    Not on file     Social Determinants of Health     Financial Resource Strain:     Difficulty of Paying Living Expenses: Not on file   Food Insecurity:     Worried About Running Out of Food in the Last Year: Not on file    Alexandra of Food in the Last Year: Not on file   Transportation Needs:     Lack of Transportation (Medical): Not on file    Lack of Transportation (Non-Medical):  Not on file   Physical Activity:     Days of Exercise per Week: Not on file    Minutes of Exercise per Session: Not on file   Stress:     Feeling of Stress : Not on file   Social Connections:     Frequency of Communication with Friends and Family: Not on file    Frequency of Social Gatherings with Friends and Family: Not on file    Attends Sikh Services: Not on file    Active Member of [address] [place] [address] [email or web address] or Organizations: Not on file    Attends Club or Organization Meetings: Not on file    Marital Status: Not on file   Intimate Partner Violence:     Fear of Current or Ex-Partner: Not on file    Emotionally Abused: Not on file    Physically Abused: Not on file    Sexually Abused: Not on file   Housing Stability:     Unable to Pay for Housing in the Last Year: Not on file    Number of [place] in the Last Year: Not on file    Unstable Housing in the Last Year: Not on file     Immunizations- reviewed:   Immunization History   Administered Date(s) Administered    Influenza Vaccine [employer]) PF (>6 Mo Flulaval, Fluarix, and >3 Yrs Loren Kowalski [id number]) 01/31/2022    Tdap 03/28/2022       Objective:     Visit Vitals  /73 (BP 1 Location: Right arm, BP Patient Position: Sitting)   Pulse 79   Resp 17   Ht 5' 2\" (1.575 m)   Wt 218 lb (98.9 kg)   LMP  (LMP Unknown)   SpO2 98%   BMI 39.87 kg/m²       Physical Exam:  GENERAL APPEARANCE: alert, well appearing, in no apparent distress  ABDOMEN: gravid, fundal height 39 cm, FHT present at 135 bpm  PSYCH: normal mood and affect  SVE: closed, thick, high. Chaperoned by Miki Gonzalez     Labs  No results found for this or any previous visit (from the past 12 hour(s)). Assessment         ICD-10-CM ICD-9-CM    1. 36 weeks gestation of pregnancy  Z3A.36 V22.2 CULTURE, GENITAL GROUP B STREP      CULTURE, GENITAL GROUP B STREP   2. GDM, class A1  O24.410 648.80    3. Maternal obesity affecting pregnancy, antepartum  O99.210 649.13    4. Multigravida of advanced maternal age in third trimester  O09.523 65.56    5. Prediabetes  R73.03 790.29    6. History of spontaneous   Z87.59 V13.29    7. Late prenatal care affecting pregnancy in third trimester  O09.33 V23.7        Plan   46 y.o. G5  36w4d JC 2022, by Ultrasound here for return OB visit     1. IUP: O+, ab neg, Hg frac wnl, Hgb 11.9, s/p flu vaccine, NIPT- low risk male, neg carrier screening  - PNV daily  - Cephalic on last MFM US at   2. A1GDM: early 1' GTT positive at 225. A1C 5.7, Previously discussed risks to mother and baby due to uncontrolled BGs. Blood sugars all remain at goal. S/p DE visit.   - Counseled on diabetic diet and continue to keep BG log.  3. Asymmetric growth restriction: Resolved now. Noted on  US, AC & FL <5th%, fetal movement and fluid volume appear normal, NIPT low risk  - Was getting  testing weekly with MF, last BPP done on  and  and no longer growth restricted (AC and EFW 32% now and HC<5% but within 2 SD). No longer needs to get weekly testing  4. Possible clubbed feet: On last [address] on  lower extremities were not well visualized. 5. Mild left fetal renal pyelectasis: Resolved on  US  6. Simple ovarian cyst: [id number], torsion precautions were reviewed by MFM  7. SAB: x2. First at ? 5months (pt reports <20w) and 2nd at 7w (2019) s/p D&C per Dr. Walker Li notes.   8. Threatened : 21, evaluated by ED. No bleeding since.   9. AMA:  - ASA daily  10. Late prenatal care: 1st and 3rd tri UDS neg      Orders Placed This Encounter    CULTURE, GENITAL GROUP B STREP     Standing Status:   Future     Number of Occurrences:   1     Standing Expiration Date:   5/23/2023     Labor precautions discussed, including: Regular painful contractions, lasting for greater than one hour, taking your breath away; any vaginal bleeding; any leakage of fluid; or absent or decreased fetal movement. Call M.D. on call if any of these symptoms or signs occur. I have discussed the diagnosis with the patient and the intended plan as seen in the above orders. The patient has received an after-visit summary and questions were answered concerning future plans. I have discussed medication side effects and warnings with the patient as well. Informed pt to return to the office or go to the ER if she experiences vaginal bleeding, vaginal discharge, leaking of fluid, pelvic cramping.     Patient discussed with Dr. Tammi Martinez (attending physician)    Elkin Charles DO  Family Medicine Resident

## 2022-05-23 NOTE — PATIENT INSTRUCTIONS
Semanas 34 a 36 de watson embarazo: Instrucciones de cuidado  Weeks 34 to 36 of Your Pregnancy: Care Instructions  Instrucciones de cuidado     A estas [place], watson bebé y watson abdomen habrán crecido considerablemente. Sofía es [place] de mars a yolande. Los pulmones de watson bebé están sofía listos para respirar aire. Los huesos de la shanna de watson bebé ahora son bastante firmes jayesh para protegerla braydon se mantienen lo suficientemente blandos jayesh para atravesar el canal de [place]. Es posible que sienta entusiasmo, manda, ansiedad o miedo. Quizá se pregunte cómo se dará cuenta de si está en trabajo de parto o qué esperar en dariusz momento. Trate de ser flexible con linda expectativas respecto del nacimiento. Dado que cada nacimiento es diferente, no hay manera de saber exactamente cómo será watson parto. Esta hoja de cuidados la ayudará a saber qué esperar y cómo prepararse. Le podría facilitar el parto. Si todavía no le kwon aplicado la vacuna Tdap (tétanos, difteria y tos [place]) rony patricia [place], hable con watson médico acerca de aplicársela. Gwynda Laming a proteger a watson recién nacido contra la infección por tos ferina. En la semana 36, a la mayoría de las mujeres se les hace stephanie prueba de estreptococos del josh B (GBS, por linda siglas en inglés). Los estreptococos del josh B son bacterias comunes que pueden vivir en la vagina y el recto. Pueden hacer que watson bebé se enferme después del parto. Si el resultado es positivo, usted recibirá antibióticos rony el trabajo de Marilyn. Los medicamentos evitarán que watson bebé contraiga las bacterias. La atención de seguimiento es stephanie parte clave de watson tratamiento y seguridad. Asegúrese de hacer y acudir a todas las citas, y llame a watson médico si está teniendo problemas. También es stephanie buena idea saber los resultados de linda exámenes y mantener stephanie lista de los medicamentos que agnes. ¿Cómo puede cuidarse en el hogar?   Aprenda sobre las alternativas para aliviar el dolor  · El dolor se manifiesta de modo diferente en cada tejal. Hable con watson médico acerca de linda sentimientos sobre el dolor. · Puede elegir entre varias formas de aliviar el dolor. Estas incluyen medicamentos o técnicas de respiración, así jayesh medidas para estar cómoda. Usted puede utilizar más de [place] opción. · Si elige un analgésico (medicamento para el dolor) rony el trabajo de [place], hable con watson médico acerca de linda opciones. Algunos medicamentos reducen la ansiedad y [place] [facility] a aliviar parte del dolor. Otros adormecen la parte inferior del cuerpo para que no sienta dolor. · Asegúrese de decirle a watson médico acerca de watson elección de analgésico antes de empezar el trabajo de parto o muy temprano en el [place] de [place]. Es posible que pueda cambiar de parecer a medida que avanza el [place] de [place]. · Eri vez se duerme a stephanie tejal con medicamentos administrados a través de stephanie máscara o por vía intravenosa (IV). Trabajo de parto y Marilyn  · La primera etapa del [place] de parto se divide en bambi fases: Alejandra Ponto y de transición. ? La mayoría de las mujeres experimentan la fase latente del [place] de parto en linda hogares. Usted puede [employer] o descansar, comer refrigerios livianos, beber líquidos daquan y comenzar a contar las contracciones. ? Cuando advierta que se le vuelve difícil hablar rony stephanie contracción, es posible que esté por pasar a la fase activa. Rony la fase Adron Lento, debería ir al hospital si no está allí aún. ? Usted está en la fase activa cuando tiene contracciones cada 3 o 4 minutos y mohan alrededor de 60 segundos. El sergio uterino comienza a abrirse con más rapidez.  ? Si se le rompe la daly, las contracciones serán más intensas y más frecuentes. ? Rony la fase de transición, el sergio uterino se estira y las contracciones se producen con Natalia Ra. ? [place] tenga deseos de pujar, sin embargo es posible que el sergio uterino aún no esté preparado.  El [employer] dirá cuándo pujar. · La segunda etapa comienza cuando el sergio uterino se abre por completo y usted está lista para pujar. ? Las contracciones son muy intensas a fin de empujar al bebé por el canal de parto. ? Sentirá la necesidad de pujar. Podría sentir jayesh si tuviera ganas de evacuar el intestino. ? Alyse Blankens[place] entrenen a [employer]. Estas contracciones serán muy intensas braydon no ocurrirán con tanta frecuencia. Puede descansar un poco entre contracciones. ? Es posible que esté sensible e irritable. Es posible que no se dé cuenta de lo que pasa a watson alrededor. ? Un último esfuerzo y habrá nacido watson bebé. · La tercera etapa ocurre cuando con unas cuantas contracciones más se expulsa la placenta. [place] puede durar 30 minutos o menos. · La cuarta etapa es la de recuperación. Es posible que se sienta abrumada con las emociones y exhausta braydon alerta. Ynes es un buen momento para comenzar el amamantamiento. ¿Dónde puede encontrar más información en inglés? Vaya a http://www.gray.com/  Dotty [id number] en la búsqueda para aprender más acerca de \"Semanas 34 a 39 de watson embarazo: Instrucciones de cuidado. \"  Revisado: 16 junio, 2021               Versión del contenido: 13.2  © 6668-7432 Healthwise, Incorporated. Las instrucciones de cuidado fueron adaptadas bajo licencia por [employer] (which disclaims liability or warranty for this information). Si usted tiene [place] afección médica o sobre estas instrucciones, siempre pregunte a watson profesional de candy. Healthwise, Incorporated niega toda garantía o responsabilidad por watson uso de esta información.

## 2022-05-24 LAB
BACTERIA SPEC CULT: ABNORMAL
SERVICE CMNT-IMP: ABNORMAL

## 2022-05-28 ENCOUNTER — HOSPITAL ENCOUNTER (INPATIENT)
Age: 42
LOS: 3 days | Discharge: HOME | DRG: 560 | End: 2022-05-31
Attending: FAMILY MEDICINE | Admitting: OBSTETRICS & GYNECOLOGY
Payer: MEDICAID

## 2022-05-28 ENCOUNTER — HOSPITAL ENCOUNTER (EMERGENCY)
Age: 42
Discharge: HOME | DRG: 560 | End: 2022-05-28
Attending: OBSTETRICS & GYNECOLOGY | Admitting: OBSTETRICS & GYNECOLOGY
Payer: MEDICAID

## 2022-05-28 VITALS
DIASTOLIC BLOOD PRESSURE: 81 MMHG | RESPIRATION RATE: 14 BRPM | SYSTOLIC BLOOD PRESSURE: 122 MMHG | HEART RATE: 77 BPM | OXYGEN SATURATION: 98 % | TEMPERATURE: 98.6 F

## 2022-05-28 DIAGNOSIS — O24.410 GDM, CLASS A1: ICD-10-CM

## 2022-05-28 PROBLEM — Z34.90 PREGNANCY: Status: ACTIVE | Noted: 2022-05-28

## 2022-05-28 LAB
A1 MICROGLOB PLACENTAL VAG QL: NEGATIVE
A1 MICROGLOB PLACENTAL VAG QL: NEGATIVE
BASOPHILS # BLD: 0 K/UL (ref 0–0.1)
BASOPHILS NFR BLD: 0 % (ref 0–1)
CONTROL LINE PRESENT?: NORMAL
CONTROL LINE PRESENT?: NORMAL
DAILY QC (YES/NO)?: YES
DIFFERENTIAL METHOD BLD: ABNORMAL
EOSINOPHIL # BLD: 0.1 K/UL (ref 0–0.4)
EOSINOPHIL NFR BLD: 1 % (ref 0–7)
ERYTHROCYTE [DISTWIDTH] IN BLOOD BY AUTOMATED COUNT: 13.3 % (ref 11.5–14.5)
EXPIRATION DATE: NORMAL
EXPIRATION DATE: NORMAL
GLUCOSE BLD STRIP.AUTO-MCNC: 93 MG/DL (ref 65–117)
HCT VFR BLD AUTO: 35.8 % (ref 35–47)
HGB BLD-MCNC: 11.8 G/DL (ref 11.5–16)
IMM GRANULOCYTES # BLD AUTO: 0.2 K/UL (ref 0–0.04)
IMM GRANULOCYTES NFR BLD AUTO: 2 % (ref 0–0.5)
INTERNAL NEGATIVE CONTROL: NORMAL
INTERNAL NEGATIVE CONTROL: NORMAL
KIT LOT NO.: NORMAL
KIT LOT NO.: NORMAL
LYMPHOCYTES # BLD: 3.2 K/UL (ref 0.8–3.5)
LYMPHOCYTES NFR BLD: 25 % (ref 12–49)
MCH RBC QN AUTO: 27.6 PG (ref 26–34)
MCHC RBC AUTO-ENTMCNC: 33 G/DL (ref 30–36.5)
MCV RBC AUTO: 83.6 FL (ref 80–99)
MONOCYTES # BLD: 0.9 K/UL (ref 0–1)
MONOCYTES NFR BLD: 7 % (ref 5–13)
NEUTS SEG # BLD: 8.2 K/UL (ref 1.8–8)
NEUTS SEG NFR BLD: 65 % (ref 32–75)
NRBC # BLD: 0 K/UL (ref 0–0.01)
NRBC BLD-RTO: 0 PER 100 WBC
PH, VAGINAL FLUID: 5 (ref 5–6.1)
PLATELET # BLD AUTO: 195 K/UL (ref 150–400)
PMV BLD AUTO: 12 FL (ref 8.9–12.9)
RBC # BLD AUTO: 4.28 M/UL (ref 3.8–5.2)
SERVICE CMNT-IMP: NORMAL
WBC # BLD AUTO: 12.6 K/UL (ref 3.6–11)

## 2022-05-28 PROCEDURE — 65270000029 HC RM PRIVATE

## 2022-05-28 PROCEDURE — 85025 COMPLETE CBC W/AUTO DIFF WBC: CPT

## 2022-05-28 PROCEDURE — 84112 EVAL AMNIOTIC FLUID PROTEIN: CPT | Performed by: OBSTETRICS & GYNECOLOGY

## 2022-05-28 PROCEDURE — 2709999900 HC NON-CHARGEABLE SUPPLY

## 2022-05-28 PROCEDURE — 75410000002 HC LABOR FEE PER 1 HR: Performed by: OBSTETRICS & GYNECOLOGY

## 2022-05-28 PROCEDURE — 75810000275 HC EMERGENCY DEPT VISIT NO LEVEL OF CARE

## 2022-05-28 PROCEDURE — 59025 FETAL NON-STRESS TEST: CPT

## 2022-05-28 PROCEDURE — 99285 EMERGENCY DEPT VISIT HI MDM: CPT

## 2022-05-28 PROCEDURE — 36415 COLL VENOUS BLD VENIPUNCTURE: CPT

## 2022-05-28 PROCEDURE — 83986 ASSAY PH BODY FLUID NOS: CPT | Performed by: OBSTETRICS & GYNECOLOGY

## 2022-05-28 PROCEDURE — 74011000258 HC RX REV CODE- 258

## 2022-05-28 PROCEDURE — 74011250636 HC RX REV CODE- 250/636

## 2022-05-28 PROCEDURE — 74011000250 HC RX REV CODE- 250

## 2022-05-28 PROCEDURE — 76060000078 HC EPIDURAL ANESTHESIA: Performed by: ANESTHESIOLOGY

## 2022-05-28 PROCEDURE — 77030014125 HC TY EPDRL BBMI -B: Performed by: ANESTHESIOLOGY

## 2022-05-28 PROCEDURE — 82962 GLUCOSE BLOOD TEST: CPT

## 2022-05-28 RX ORDER — NALOXONE HYDROCHLORIDE 0.4 MG/ML
0.4 INJECTION, SOLUTION INTRAMUSCULAR; INTRAVENOUS; SUBCUTANEOUS AS NEEDED
Status: DISCONTINUED | OUTPATIENT
Start: 2022-05-28 | End: 2022-05-29

## 2022-05-28 RX ORDER — OXYTOCIN/RINGER'S LACTATE 30/500 ML
10 PLASTIC BAG, INJECTION (ML) INTRAVENOUS AS NEEDED
Status: DISCONTINUED | OUTPATIENT
Start: 2022-05-28 | End: 2022-05-29

## 2022-05-28 RX ORDER — SODIUM CHLORIDE 0.9 % (FLUSH) 0.9 %
5-40 SYRINGE (ML) INJECTION EVERY 8 HOURS
Status: DISCONTINUED | OUTPATIENT
Start: 2022-05-29 | End: 2022-05-29

## 2022-05-28 RX ORDER — OXYTOCIN/RINGER'S LACTATE 30/500 ML
87.3 PLASTIC BAG, INJECTION (ML) INTRAVENOUS AS NEEDED
Status: DISCONTINUED | OUTPATIENT
Start: 2022-05-28 | End: 2022-05-29

## 2022-05-28 RX ORDER — SODIUM CHLORIDE 0.9 % (FLUSH) 0.9 %
5-40 SYRINGE (ML) INJECTION AS NEEDED
Status: DISCONTINUED | OUTPATIENT
Start: 2022-05-28 | End: 2022-05-29

## 2022-05-28 RX ORDER — SODIUM CHLORIDE, SODIUM LACTATE, POTASSIUM CHLORIDE, CALCIUM CHLORIDE 600; 310; 30; 20 MG/100ML; MG/100ML; MG/100ML; MG/100ML
125 INJECTION, SOLUTION INTRAVENOUS CONTINUOUS
Status: DISCONTINUED | OUTPATIENT
Start: 2022-05-29 | End: 2022-05-29

## 2022-05-28 RX ADMIN — SODIUM CHLORIDE, POTASSIUM CHLORIDE, SODIUM LACTATE AND CALCIUM CHLORIDE 999 ML/HR: 600; 310; 30; 20 INJECTION, SOLUTION INTRAVENOUS at 23:26

## 2022-05-28 RX ADMIN — SODIUM CHLORIDE, PRESERVATIVE FREE 10 ML: 5 INJECTION INTRAVENOUS at 23:25

## 2022-05-28 RX ADMIN — SODIUM CHLORIDE 5 MILLION UNITS: 900 INJECTION INTRAVENOUS at 23:34

## 2022-05-28 NOTE — H&P
[address] [address]   Office [phone], Fax [phone]      History & Physical    Name: Tonia Foster MRN: [id number]  SSN: [id number]    YOB: 1980  Age: 46 y.o. Sex: female      Subjective:     Reason for Admission:  Pregnancy and decreased fetal movement    History of Present Illness: Ms. Marina Adan is a 46 y.o. G 5 P   female with an estimated gestational age of 42w2d with Estimated Date of Delivery: 22. Patient complains of decreased fetal movement for 1 day. She says that overnight she was feeling the baby move less, but the baby started to move a little bit more this morning. She has been drinking water since she arrived to the unit about 30 minutes ago, and baby has returned to normal movement. She also reports that she has had leaking of a large amount of clear fluid. Pregnancy has been complicated by GDMA1, asymmetric growth restriction, history of SAB and , AMA, late to prenatal care. Patient denies abdominal pain  , chest pain, contractions, fever, headache , nausea and vomiting, pelvic pressure, right upper quadrant pain  , shortness of breath, swelling, vaginal bleeding  and visual disturbances. OB History    Para Term  AB Living   5 2 2 0 2 2   SAB IAB Ectopic Molar Multiple Live Births   2 0 0 0 0 2      # Outcome Date GA Lbr Ananth/2nd Weight Sex Delivery Anes PTL Lv   5 Current            4 2018           3 Term 04    M Vag-Spont   ARTURO   2 Term 00    F Vag-Spont   ARTURO   1 SAB  7w0d            No past medical history on file.   Past Surgical History:   Procedure Laterality Date    HX DILATION AND CURETTAGE  10/2018     Social History     Occupational History    Not on file   Tobacco Use    Smoking status: Never Smoker    Smokeless tobacco: Never Used   Substance and Sexual Activity    Alcohol use: Never    Drug use: Never    Sexual activity: Yes     Partners: Male     Birth control/protection: None      No family history on file. No Known Allergies  Prior to Admission medications    Medication Sig Start Date End Date Taking? Authorizing Provider   lancets misc Check blood glucose 4 times daily. In the morning fasting and two hours after each meal 22   Shireen Duverney, DO   glucose blood VI test strips (ReliOn Prime Test Strips) strip Check blood glucose 4 times daily. In the morning fasting and two hours after each meal 22   Shireen Duverney, DO   Blood-Glucose Meter (ReliOn Prime Meter) misc Check blood glucose 4 times daily. In the morning fasting and two hours after each meal 22   Shireen Duverney, DO   acetaminophen (Tylenol Extra Strength) 500 mg tablet Take 500 mg by mouth every six (6) hours as needed for Pain. Provider, Historical   aspirin delayed-release 81 mg tablet Take 1 Tablet by mouth daily. 22   Anali Cortez,    prenatal multivit-ca-min-fe-fa (PRENATAL VITAMIN) tab Take 1 Tab by mouth daily. 19   Maria De Jesus Morales MD        Review of Systems:  A comprehensive review of systems was negative except for that written in the History of Present Illness. Objective:     Vitals:    Vitals:    22 1401 22 1402 22 1406 22 1412   BP:  128/79  128/79   Pulse:  (!) 101  91   Resp:    18   Temp:    98.8 °F (37.1 °C)   SpO2: 96% 92% 96% 96%      Temp (24hrs), Av.8 °F (37.1 °C), Min:98.8 °F (37.1 °C), Max:98.8 °F (37.1 °C)    BP  Min: 128/79  Max: 128/79     Physical Exam:  Patient without distress.   Heart: Regular rate and rhythm, S1S2 present or without murmur or extra heart sounds  Lung: clear to auscultation throughout lung fields, no wheezes, no rales, no rhonchi and normal respiratory effort  Abdomen: soft, nontender  Fundus: soft and non tender  Lower Extremities:  - Edema No     Uterine Activity:  Frequency: Every 10 minutes   Duration: 60 seconds  Intensity: mild  Fetal Heart Rate:  Reactive  Baseline: 155 per minute  Variability: moderate  Accelerations: yes  Decelerations: none       Lab/Data Review:  Recent Results (from the past 24 hour(s))   PH BY NITRAZINE, POC    Collection Time: 05/28/22  2:10 PM   Result Value Ref Range    pH-Nitrazine paper 5 5.0 - 6.1    Daily QC performed? Yes        Assessment and Plan:     Ms. Gigi Arambula is a 46 y.o. G 5 P 2022  female with an estimated gestational age of 42w2d who is here for decreased fetal movement. 1. Decreased fetal movement in s/o SIUP at 37w2d: Likely 2/2 dehydration, given improvement with oral hydration. Patient was both nitrazine and AmniSure negative, indicating that clear fluid that she experienced is not amniotic fluid, but likely cervical mucus or urine. NST is reactive, and patient states that her baby is moving like normal.  At this point it is safe for her to discharge. She was given return precautions and recommended to hydrate well. I have discussed the diagnosis with the patient and the intended plan as seen in the above orders. All questions were answered concerning future plans. I have discussed medication side effects and warnings with the patient as well. Labor precautions discussed, including: Regular painful contractions, lasting for greater than one hour, taking your breath away; any vaginal bleeding; any leakage of fluid; or absent or decreased fetal movement. Call M.D. on call if any of these symptoms or signs occur.       Patient seen and examined with Dr. Wilbert Shaver MD  Family Medicine Resident

## 2022-05-28 NOTE — DISCHARGE INSTRUCTIONS
Patient Education   Patient Education   Patient Education   Patient Education        Week 45 of Your Pregnancy: Care Instructions  Overview     Believe it or not, your baby is almost here. You may have ideas about your baby's personality because of how much your baby moves. Or you may have noticed how your baby responds to sounds, warmth, cold, and light. You may even know what kind of music your baby likes. By now, you have a better idea of what to expect during delivery. You may have talked about your birth preferences with your doctor. But even if you want a vaginal birth, it's a good idea to learn about  births.  birth means that your baby is born through a cut (incision) in your lower belly. In some cases it may be the best choice for the health of you and your baby. Follow-up care is a key part of your treatment and safety. Be sure to make and go to all appointments, and call your doctor if you are having problems. It's also a good idea to know your test results and keep a list of the medicines you take. How can you care for yourself at home? Learn about  birth  · Most C-sections are unplanned. They are done because of problems that occur during labor. These problems might include:  ? Labor that slows or stops. ? High blood pressure or other problems for you.  ? Signs of distress in your baby. These signs may include a very fast or slow heart rate. · Although you and your baby are likely do well after a , it is major surgery. It has more risks than a vaginal delivery. · In some cases, a planned  may be safer than a vaginal delivery. This may be the case if:  ? You have a health problem, such as a heart condition. ? Your baby isn't in a head-down position for delivery. This is called a breech position. ? The uterus has scars from past surgeries. This could increase the chance of a tear in the uterus. ? There is a problem with the placenta.  ?  You have an infection, such as genital herpes, that could be spread to your baby. ? You are having twins or more. ? Your baby weighs 9 to 10 pounds or more. · Because of the risks of a , planned C-sections generally should be done only for medical reasons. And a planned  should be done at 39 weeks or later unless there is a medical reason to do it sooner. Know what to expect after delivery, and plan for the first few weeks at home  · You, your baby, and your partner or  will get identification bands. Only people with matching bands can  the baby from the nursery. · You will learn how to feed, diaper, and bathe your baby. And you will learn how to care for the umbilical cord stump. If your baby will be circumcised, you will also learn how to care for that. · Ask people to wait to visit you until you are at home. And ask them to wash their hands before they touch your baby. · Make sure you have another adult in your home for at least 2 or 3 days after the birth. · During the first 2 weeks, limit when friends and family can visit. · Do not allow visitors who have colds or infections. Make sure all visitors are up to date with their vaccinations. Never let anyone smoke around your baby. · Try to nap when the baby naps. Be aware of postpartum depression  · \"Baby blues\" are common for the first 1 to 2 weeks after birth. You may cry or feel sad or irritable for no reason. · Sometimes these feelings last longer and are more intense. This is called postpartum depression. · If your symptoms last for more than a few weeks or you feel very depressed, ask your doctor for help. · Postpartum depression can be treated. Support groups and counseling can help. Sometimes medicine can also help. Where can you learn more? Go to http://www.gray.com/  Enter B044 in the search box to learn more about \"Week 38 of Your Pregnancy: Care Instructions. \"  Current as of:  2021               Content Version: 13.2  © 3865-9354 [employer]. Care instructions adapted under license by [employer] (which disclaims liability or warranty for this information). If you have questions about a medical condition or this instruction, always ask your healthcare professional. [address] any warranty or liability for your use of this information. Semana 38 de watson embarazo: Instrucciones de cuidado  Week 38 of Your Pregnancy: Care Instructions  Instrucciones de cuidado     Aunque no lo crea, watson bebé está por nacer. Es posible que ya tenga ideas acerca de la personalidad de watson bebé debido a cuánto se mueve. O sherrie vez haya notado cómo responde a los sonidos, al calor, al frío y a la yolande. Incluso podría saber qué tipo de [place] a watson bebé. A estas [place], usted tiene [address] idea de qué puede esperar rony el [place]. Es posible que haya hablado de linda preferencias del nacimiento con watson médico. Cristin incluso si usted quiere un nacimiento por vía vaginal, es stephanie buena idea aprender [employer] nacimientos por cesárea. Los partos por cesárea son [employer] bebés nacen por medio de un tate (incisión) hecho en la parte inferior del abdomen. A veces, es la mejor opción para la candy del bebé y de [place]. Esta hoja de cuidados puede ayudarla a entender los nacimientos por cesárea. También le da información sobre qué esperar después del nacimiento de watson bebé. Y la ayuda a comprender [employer] depresión posparto. La atención de seguimiento es stephanie parte clave de watson tratamiento y seguridad. Asegúrese de hacer y acudir a todas las citas, y llame a watson médico si está teniendo problemas. También es stephanie buena idea saber los resultados de linda exámenes y mantener stephanie lista de los medicamentos que agnes. ¿Cómo puede cuidarse en el hogar? Aprenda sobre el parto por cesárea  · La mayoría de los partos por cesárea no están planeados.  Se hacen por problemas que se producen rony el trabajo de Marilyn. Estos problemas podrían incluir:  ? El [place] de parto se vuelve más lento o se detiene. ? Presión arterial faheem u otros problemas para la madre.  ? Señales de sufrimiento del bebé. Estas señales pueden incluir stephanie frecuencia cardíaca muy rápida o lenta. · [place] y los bebés están rosaline después de un parto por cesárea, la cesárea es stephanie Charlesetta Melo. Tiene más riesgos que un parto vaginal.  · En algunos casos, un parto por cesárea planificado puede ser más seguro que un parto vaginal. [place] puede ser el denise si:  ? La madre tiene un problema de candy, jayesh stephanie afección cardíaca. ? El bebé no está en posición con la shanna para abajo para el parto. Esta posición se llama de nalgas. ? El útero tiene cicatrices de cirugías anteriores. [place] podría aumentar la probabilidad de un desgarro en el D[place] Brunette. ? Hay un problema con la placenta. ? La madre tiene stephanie infección, jayesh herpes genital, que podría transmitirse al bebé. ? La madre está embarazada con mellizos o más bebés. ? El bebé pesa de 9 a 8 libras o más. · Debido a los riesgos del parto por cesárea, las cesáreas planeadas deberían hacerse generalmente solo por motivos médicos. Y stephanie cesárea planeada debería hacerse en la semana 44 o más tarde rosa que haya un motivo médico para hacerla antes. Sepa lo que ocurrirá después del parto y cómo planificar las primeras semanas en watson hogar  · Usted, watson bebé y watson akiko o instructor Mike Hurry bandas de identificación. Solo las personas que tengan bandas que coincidan podrán retirar al bebé de la chely de recién nacidos. · Aprenderá a alimentar a watson bebé, cambiar el pañal y bañar al bebé. [place] a cuidar del muñón del cordón umbilical. Si Anna Marie luis e a circuncidar a watson bebé, también aprenderá a cuidar stephanie circuncisión. · Pídale a las visitas que esperen a visitarla cuando esté en watson hogar.  Y pídales que se laven las anthony antes de tocar al bebé. · Asegúrese de tener otro adulto en casa por lo menos 2 o 3 días después del [place]. · [place] las primeras 2 semanas, limite las visitas de familiares y amigos. · No permita visitantes que tengan resfriados o infecciones. Asegúrese de que todos los visitantes estén al día con linda vacunas. Nunca deje que nadie fume cerca de watson bebé. · Trate de dormir stephanie siesta cuando watson bebé duerma. Sea consciente de la depresión posparto  · La \"melancolía de la maternidad\" es común en las primeras 1 o 2 semanas después del [place]. Es posible que tenga ganas de llorar o se sienta dean o irritable sin motivo alguno. · En algunas mujeres, estas emociones mohan más tiempo y son más intensas. A esto se lo conoce jayesh depresión posparto. · Si linda síntomas mohan más de unas pocas semanas, o si se siente muy deprimida, pídale ayuda a watson médico.  · La depresión posparto sí puede tratarse. Los grupos de apoyo y la asesoría psicológica pueden ser de [facility]. A veces, los medicamentos también pueden ayudar. ¿Dónde puede encontrar más información en inglés? Vaya a http://www.gray.com/  Dotty B044 en la búsqueda para aprender más acerca de \"Semana 45 de watson embarazo: Instrucciones de cuidado. \"  Revisado: 16 junio, 2021               Versión del contenido: 13.2  © 9324-2932 [employer]. Las instrucciones de cuidado fueron adaptadas bajo licencia por [employer] (which disclaims liability or warranty for this information). Si usted tiene [place] afección médica o sobre estas instrucciones, siempre pregunte a watson profesional de candy. [facility] niega toda garantía o responsabilidad por watson uso de esta información. Precauciones en el embarazo:  Instrucciones de cuidado  Pregnancy Precautions: Care Instructions  Instrucciones de cuidado     No hay stephanie manera rios de prevenir el trabajo de parto antes de la fecha renéeerada Thong williamson parto prematuro) o de prevenir la mayoría de otros problemas en [facility]. Cristin hay cosas que puede hacer para aumentar las probabilidades de tener un embarazo saludable. Vaya a linda citas, siga los consejos de watson médico y cuídese. Coma rosaline y tad ejercicio (si watson médico lo permite). Y asegúrese de aaron abundante agua. La atención de seguimiento es stephanie parte clave de watson tratamiento y seguridad. Asegúrese de hacer y acudir a todas las citas, y llame a watson médico si está teniendo problemas. También es stephanie buena idea saber los resultados de linda exámenes y mantener stephanie lista de los medicamentos que agnes. ¿Cómo puede cuidarse en el hogar? · Asegúrese de asistir a las citas prenatales. Watson médico le tomará la presión arterial en cada consulta. Watson médico también comprobará si tiene proteínas en watson orina. Tanto la presión arterial faheem jayesh la presencia de proteínas en la orina son señales de preeclampsia. Esta afección puede ser peligrosa tanto para usted jayesh para watson bebé. · Melinda mucho líquido. La deshidratación puede causar contracciones. Si tiene stephanie enfermedad renal, cardíaca o hepática y tiene que restringir los líquidos, hable con watson médico antes de aumentar la cantidad de líquido que avi. · Informe al médico de inmediato si nota algún síntoma de infección, jayesh:  ? Ardor cuando orina. ? Flujo con mal olor de la vagina. ? Comezón en la vagina. ? [place] sin explicación. ? Dolor o sensibilidad inusual en el útero o la parte baja del abdomen. · Aliméntese de forma equilibrada. Incluya muchos alimentos con alto contenido de calcio y alissa. ? Entre los alimentos ricos en calcio se incluyen la [place], el queso, el yogur, Heydi Maclachlan y el brócoli. ? Entre los alimentos ricos en alissa se incluyen las timmy salvador, los [place], las aves, los [place], los frijoles, las uvas pasas, el pan de grano integral y las verduras de hojas verdes. · No fume.  Si necesita ayuda para dejar de fumar, hable con watson médico sobre programas y medicamentos para dejar de fumar. Estos pueden aumentar linda probabilidades de dejar el hábito para siempre. · No tiny alcohol ni consuma marihuana o drogas ilegales. · [address] El médico le dirá cuánto ejercicio puede hacer, si es que puede [place]. · Pregúntele a watson médico si puede tener [employer]. Si usted está en riesgo de tener trabajo de [place], watson médico podría pedirle que no tenga relaciones sexuales. · Tenga cuidado para evitar caerse. Trent el [facility], las articulaciones están flojas y pierde el equilibrio. Las actividades jayesh montar en bicicleta, esquiar o el patinaje en línea pueden aumentar el riesgo de caerse. Y no monte a alida o en motocicleta, bucee, practique esquí acuático, tad submarinismo ni salte en paracaídas mientras esté embarazada. · Evite las cosas que pueden hacer que watson cuerpo se acalore demasiado y que puedan ser perjudiciales para watson bebé, jayesh stephanie bañera de hidromasaje o stephanie sauna. O hable con el médico antes de hacer algo que eleve la temperatura del cuerpo. El médico puede decirle si es seguro. · No tome medicamentos de venta luther, productos herbarios ni suplementos sin hablar bill con watson médico o farmacéutico.  ¿Cuándo debe pedir ayuda? Llame al 911  en cualquier momento que considere que necesita atención de [place]. Por ejemplo, llame si:    · Se desmayó (perdió el conocimiento).     · Tiene convulsiones.     · Tiene sangrado vaginal intenso.     · Tiene dolor intenso en el abdomen o la pelvis.     · Le sale abundante líquido o gotea de la vagina y sabe o emmanuel que el cordón umbilical se está saliendo a watson vagina. Si esto sucede, arrodíllese de inmediato, de sherrie forma que linda nalgas estén más altas que watson shanna. [place] disminuirá la presión sobre el cordón umbilical hasta que llegue la [facility].    Llame a watson médico ahora mismo o busque atención médica inmediata si:    · Tiene señales de preeclampsia, jayesh:  ? Hinchazón repentina de la kiara, las anthony o los pies. ? Nuevos problemas de visión (jayesh oscurecimiento, isra borroso o isra puntos). ? Dolor de shanna intenso.     · Tiene cualquier sangrado vaginal.     · Tiene dolor o cólicos abdominales.     · Tiene fiebre.     · Idalia Hove tenido contracciones regulares (con o sin dolor) por [place]. [place] significa que tiene 8 o más contracciones en 1 hora o que tiene 4 contracciones o más en 20 minutos después de [place] de posición y aaron líquidos.     · Tiene stephanie pérdida repentina de líquido por la vagina.     · Tiene dolor en la parte baja de la espalda o presión en la pelvis que no desaparece.     · Nota que watson bebé ha dejado de moverse o se mueve mucho menos de lo normal.   Preste especial atención a los cambios en watson candy y asegúrese de comunicarse con watson médico si tiene algún problema. ¿Dónde puede encontrar más información en inglés? Jaci Elizabeth a http://www.gray.com/  Dotty [id number] en la búsqueda para aprender más acerca de \"Precauciones en el embarazo: Instrucciones de cuidado. \"  Revisado: 2021               Versión del contenido: 13.2  © 7135-5897 [employer]. Las instrucciones de cuidado fueron adaptadas bajo licencia por [employer] (which disclaims liability or warranty for this information). Si usted tiene [place] afección médica o sobre estas instrucciones, siempre pregunte a watson profesional de candy. [employer] Inc[employer] niega toda garantía o responsabilidad por watson uso de esta información. Pregnancy Precautions: Care Instructions  Your Care Instructions     There is no sure way to prevent labor before your due date ( labor) or to prevent most other pregnancy problems. But there are things you can do to increase your chances of a healthy pregnancy. Go to your appointments, follow your doctor's advice, and take good care of yourself.  Eat well, and exercise (if your doctor agrees). And make sure to drink plenty of water. Follow-up care is a key part of your treatment and safety. Be sure to make and go to all appointments, and call your doctor if you are having problems. It's also a good idea to know your test results and keep a list of the medicines you take. How can you care for yourself at home? · Make sure you go to your prenatal appointments. At each visit, your doctor will check your blood pressure. Your doctor will also check to see if you have protein in your urine. High blood pressure and protein in urine are signs of preeclampsia. This condition can be dangerous for you and your baby. · Drink plenty of fluids. Dehydration can cause contractions. If you have kidney, heart, or liver disease and have to limit fluids, talk with your doctor before you increase the amount of fluids you drink. · Tell your doctor right away if you notice any symptoms of an infection, such as:  ? Burning when you urinate. ? A foul-smelling discharge from your vagina. ? Vaginal itching. ? Unexplained fever. ? Unusual pain or soreness in your uterus or lower belly. · Eat a balanced diet. Include plenty of foods that are high in calcium and iron. ? Foods high in calcium include milk, cheese, yogurt, almonds, and broccoli. ? Foods high in iron include red meat, shellfish, poultry, eggs, beans, raisins, whole-grain bread, and leafy green vegetables. · Do not smoke. If you need help quitting, talk to your doctor about stop-smoking programs and medicines. These can increase your chances of quitting for good. · Do not drink alcohol or use marijuana or illegal drugs. · Follow your doctor's directions about activity. Your doctor will let you know how much, if any, exercise you can do. · Ask your doctor if you can have sex. If you are at risk for early labor, your doctor may ask you to not have sex. · Take care to prevent falls.  During pregnancy, your joints are loose, and your balance is off. Sports such as bicycling, skiing, or in-line skating can increase your risk of falling. And don't ride horses or motorcycles, dive, water ski, scuba dive, or parachute jump while you are pregnant. · Avoid things that can make your body too hot and may be harmful to your baby, such as a hot tub or sauna. Or talk with your doctor before doing anything that raises your body temperature. Your doctor can tell you if it's safe. · Do not take any over-the-counter or herbal medicines or supplements without talking to your doctor or pharmacist first.  When should you call for help? Call 911  anytime you think you may need emergency care. For example, call if:    · You passed out (lost consciousness).     · You have a seizure.     · You have severe vaginal bleeding.     · You have severe pain in your belly or pelvis.     · You have had fluid gushing or leaking from your vagina and you know or think the umbilical cord is bulging into your vagina. If this happens, immediately get down on your knees so your rear end (buttocks) is higher than your head. This will decrease the pressure on the cord until help arrives. Call your doctor now or seek immediate medical care if:    · You have signs of preeclampsia, such as:  ? Sudden swelling of your face, hands, or feet. ? New vision problems (such as dimness, blurring, or seeing spots). ? A severe headache.     · You have any vaginal bleeding.     · You have belly pain or cramping.     · You have a fever.     · You have had regular contractions (with or without pain) for an hour.  This means that you have 8 or more within 1 hour or 4 or more in 20 minutes after you change your position and drink fluids.     · You have a sudden release of fluid from your vagina.     · You have low back pain or pelvic pressure that does not go away.     · You notice that your baby has stopped moving or is moving much less than normal.   Watch closely for changes in your health, and be sure to contact your doctor if you have any problems. Where can you learn more? Go to http://www.[place].com/  Enter Y951 in the search box to learn more about \"Pregnancy Precautions: Care Instructions. \"  Current as of: June 16, 2021               Content Version: 13.2  © 5164-3590 [employer]. Care instructions adapted under license by [employer] (which disclaims liability or warranty for this information). If you have questions about a medical condition or this instruction, always ask your healthcare professional. [address] any warranty or liability for your use of this information.

## 2022-05-28 NOTE — PROGRESS NOTES
1400: Patient arrived ambulatory to unit from ED with friend who is translating from [address] for her. Patient states that she felt leaking of fluid this morning around 1100 and has had decreased fetal movement since that time. Reports no bleeding, no headache, dizziness, or RUQ pain. Appears very nervous about present situation. Placed on EFM, VS WNL, Nitrazine negative. 1415: Amnisure negative, patient now reporting that she has positive fetal movement since drinking water since arrival.  Residents and Sen Simeon MD notified of present status. 1445: Resident at bedside for assessment. 1510: Sen Simeon Md at bedside, per MD patient may be discharged home now.

## 2022-05-29 ENCOUNTER — ANESTHESIA EVENT (OUTPATIENT)
Dept: LABOR AND DELIVERY | Age: 42
DRG: 560 | End: 2022-05-29
Payer: MEDICAID

## 2022-05-29 ENCOUNTER — ANESTHESIA (OUTPATIENT)
Dept: LABOR AND DELIVERY | Age: 42
DRG: 560 | End: 2022-05-29
Payer: MEDICAID

## 2022-05-29 LAB
ALBUMIN SERPL-MCNC: 2.7 G/DL (ref 3.5–5)
ALBUMIN/GLOB SERPL: 0.7 {RATIO} (ref 1.1–2.2)
ALP SERPL-CCNC: 224 U/L (ref 45–117)
ALT SERPL-CCNC: 15 U/L (ref 12–78)
ANION GAP SERPL CALC-SCNC: 9 MMOL/L (ref 5–15)
AST SERPL-CCNC: 22 U/L (ref 15–37)
BILIRUB SERPL-MCNC: 0.2 MG/DL (ref 0.2–1)
BUN SERPL-MCNC: 9 MG/DL (ref 6–20)
BUN/CREAT SERPL: 12 (ref 12–20)
CALCIUM SERPL-MCNC: 9.3 MG/DL (ref 8.5–10.1)
CHLORIDE SERPL-SCNC: 109 MMOL/L (ref 97–108)
CO2 SERPL-SCNC: 22 MMOL/L (ref 21–32)
CREAT SERPL-MCNC: 0.74 MG/DL (ref 0.55–1.02)
GLOBULIN SER CALC-MCNC: 3.8 G/DL (ref 2–4)
GLUCOSE BLD STRIP.AUTO-MCNC: 105 MG/DL (ref 65–117)
GLUCOSE SERPL-MCNC: 81 MG/DL (ref 65–100)
POTASSIUM SERPL-SCNC: 4.6 MMOL/L (ref 3.5–5.1)
PROT SERPL-MCNC: 6.5 G/DL (ref 6.4–8.2)
SERVICE CMNT-IMP: NORMAL
SODIUM SERPL-SCNC: 140 MMOL/L (ref 136–145)

## 2022-05-29 PROCEDURE — 80053 COMPREHEN METABOLIC PANEL: CPT

## 2022-05-29 PROCEDURE — 77030019905 HC CATH URETH INTMIT MDII -A

## 2022-05-29 PROCEDURE — 74011250637 HC RX REV CODE- 250/637

## 2022-05-29 PROCEDURE — 82962 GLUCOSE BLOOD TEST: CPT

## 2022-05-29 PROCEDURE — 75410000003 HC RECOV DEL/VAG/CSECN EA 0.5 HR

## 2022-05-29 PROCEDURE — 75410000000 HC DELIVERY VAGINAL/SINGLE

## 2022-05-29 PROCEDURE — 65270000029 HC RM PRIVATE

## 2022-05-29 PROCEDURE — 74011250636 HC RX REV CODE- 250/636: Performed by: OBSTETRICS & GYNECOLOGY

## 2022-05-29 PROCEDURE — 0HQ9XZZ REPAIR PERINEUM SKIN, EXTERNAL APPROACH: ICD-10-PCS

## 2022-05-29 PROCEDURE — 74011250636 HC RX REV CODE- 250/636

## 2022-05-29 PROCEDURE — 36415 COLL VENOUS BLD VENIPUNCTURE: CPT

## 2022-05-29 PROCEDURE — 74011000250 HC RX REV CODE- 250: Performed by: ANESTHESIOLOGY

## 2022-05-29 PROCEDURE — 75410000002 HC LABOR FEE PER 1 HR

## 2022-05-29 PROCEDURE — 2709999900 HC NON-CHARGEABLE SUPPLY

## 2022-05-29 PROCEDURE — 3E033VJ INTRODUCTION OF OTHER HORMONE INTO PERIPHERAL VEIN, PERCUTANEOUS APPROACH: ICD-10-PCS

## 2022-05-29 RX ORDER — HYDROCORTISONE ACETATE PRAMOXINE HCL 2.5; 1 G/100G; G/100G
CREAM TOPICAL AS NEEDED
Status: DISCONTINUED | OUTPATIENT
Start: 2022-05-29 | End: 2022-05-29 | Stop reason: CLARIF

## 2022-05-29 RX ORDER — DOCUSATE SODIUM 100 MG/1
100 CAPSULE, LIQUID FILLED ORAL 2 TIMES DAILY
Status: DISCONTINUED | OUTPATIENT
Start: 2022-05-29 | End: 2022-05-31 | Stop reason: HOSPADM

## 2022-05-29 RX ORDER — LIDOCAINE HYDROCHLORIDE AND EPINEPHRINE 20; 5 MG/ML; UG/ML
INJECTION, SOLUTION EPIDURAL; INFILTRATION; INTRACAUDAL; PERINEURAL AS NEEDED
Status: DISCONTINUED | OUTPATIENT
Start: 2022-05-29 | End: 2022-05-29 | Stop reason: HOSPADM

## 2022-05-29 RX ORDER — EPHEDRINE SULFATE/0.9% NACL/PF 50 MG/5 ML
10 SYRINGE (ML) INTRAVENOUS
Status: DISCONTINUED | OUTPATIENT
Start: 2022-05-29 | End: 2022-05-29

## 2022-05-29 RX ORDER — SIMETHICONE 80 MG
80 TABLET,CHEWABLE ORAL
Status: DISCONTINUED | OUTPATIENT
Start: 2022-05-29 | End: 2022-05-31 | Stop reason: HOSPADM

## 2022-05-29 RX ORDER — IBUPROFEN 800 MG/1
800 TABLET ORAL EVERY 8 HOURS
Status: DISCONTINUED | OUTPATIENT
Start: 2022-05-29 | End: 2022-05-31 | Stop reason: HOSPADM

## 2022-05-29 RX ORDER — DIPHENHYDRAMINE HCL 25 MG
25 CAPSULE ORAL
Status: DISCONTINUED | OUTPATIENT
Start: 2022-05-29 | End: 2022-05-31 | Stop reason: HOSPADM

## 2022-05-29 RX ORDER — OXYTOCIN/RINGER'S LACTATE 30/500 ML
500 PLASTIC BAG, INJECTION (ML) INTRAVENOUS
Status: DISCONTINUED | OUTPATIENT
Start: 2022-05-29 | End: 2022-05-29

## 2022-05-29 RX ORDER — NALOXONE HYDROCHLORIDE 0.4 MG/ML
0.4 INJECTION, SOLUTION INTRAMUSCULAR; INTRAVENOUS; SUBCUTANEOUS AS NEEDED
Status: DISCONTINUED | OUTPATIENT
Start: 2022-05-29 | End: 2022-05-31 | Stop reason: HOSPADM

## 2022-05-29 RX ORDER — OXYTOCIN/RINGER'S LACTATE 30/500 ML
87.3 PLASTIC BAG, INJECTION (ML) INTRAVENOUS AS NEEDED
Status: DISCONTINUED | OUTPATIENT
Start: 2022-05-29 | End: 2022-05-31 | Stop reason: HOSPADM

## 2022-05-29 RX ORDER — OXYTOCIN/RINGER'S LACTATE 30/500 ML
10 PLASTIC BAG, INJECTION (ML) INTRAVENOUS AS NEEDED
Status: DISCONTINUED | OUTPATIENT
Start: 2022-05-29 | End: 2022-05-31 | Stop reason: HOSPADM

## 2022-05-29 RX ORDER — FENTANYL/BUPIVACAINE/NS/PF 2-1250MCG
12 PREFILLED PUMP RESERVOIR EPIDURAL CONTINUOUS
Status: DISCONTINUED | OUTPATIENT
Start: 2022-05-29 | End: 2022-05-29

## 2022-05-29 RX ADMIN — IBUPROFEN 800 MG: 800 TABLET, FILM COATED ORAL at 23:37

## 2022-05-29 RX ADMIN — Medication 12 ML/HR: at 00:27

## 2022-05-29 RX ADMIN — Medication 999 ML/HR: at 02:56

## 2022-05-29 RX ADMIN — DOCUSATE SODIUM 100 MG: 100 CAPSULE, LIQUID FILLED ORAL at 08:59

## 2022-05-29 RX ADMIN — LIDOCAINE HYDROCHLORIDE,EPINEPHRINE BITARTRATE 10 ML: 20; .005 INJECTION, SOLUTION EPIDURAL; INFILTRATION; INTRACAUDAL; PERINEURAL at 00:08

## 2022-05-29 RX ADMIN — DOCUSATE SODIUM 100 MG: 100 CAPSULE, LIQUID FILLED ORAL at 18:46

## 2022-05-29 RX ADMIN — SODIUM CHLORIDE, POTASSIUM CHLORIDE, SODIUM LACTATE AND CALCIUM CHLORIDE 125 ML/HR: 600; 310; 30; 20 INJECTION, SOLUTION INTRAVENOUS at 00:29

## 2022-05-29 RX ADMIN — IBUPROFEN 800 MG: 800 TABLET, FILM COATED ORAL at 08:59

## 2022-05-29 NOTE — PROGRESS NOTES
I used the [facility] language line to do the pt and her baby's assessment. The  number was [phone].

## 2022-05-29 NOTE — H&P
[address] [facility] [address] [place] [address]   Office [phone], Fax [phone]      History & Physical    Name: Racquel Alegria MRN: [id number]  SSN: [id number]    YOB: 1980  Age: 46 y.o. Sex: female      Subjective:     Reason for Admission:  Pregnancy, LOF and contractions    History of Present Illness: Ms. Matthew Babb is a 46 y.o.   female with an estimated gestational age of 42w2d with Estimated Date of Delivery: 22. Patient complains of moderate abdominal pain, moderate contractions and large vaginal leaking of fluid for 1 days. Was seen earlier today for decreased FM and LOF (starting at 80). Fetal movement improved with oral hydration and Amnisure was negative at the time. Since then, patient says she has been having increased LOF and has had to change a pad 3x. Pregnancy has been complicated by GDMA1, late to care, GBS+, Hx SAB x2. Patient denies chest pain, fever, headache , nausea and vomiting, right upper quadrant pain  , shortness of breath, vaginal bleeding , visual disturbances and dysuria. OB History    Para Term  AB Living   5 2 2 0 2 2   SAB IAB Ectopic Molar Multiple Live Births   2 0 0 0 0 2      # Outcome Date GA Lbr Ananth/2nd Weight Sex Delivery Anes PTL Lv   5 Current            4 2018           3 Term 04    M Vag-Spont   ARTURO   2 Term 00    F Vag-Spont   ARTURO   1 SAB  7w0d            History reviewed. No pertinent past medical history. Past Surgical History:   Procedure Laterality Date    HX DILATION AND CURETTAGE  10/2018     Social History     Occupational History    Not on file   Tobacco Use    Smoking status: Never Smoker    Smokeless tobacco: Never Used   Vaping Use    Vaping Use: Never used   Substance and Sexual Activity    Alcohol use: Never    Drug use: Never    Sexual activity: Yes     Partners: Male     Birth control/protection: None      History reviewed. No pertinent family history.     No Known Allergies  Prior to Admission medications    Medication Sig Start Date End Date Taking? Authorizing Provider   lancets misc Check blood glucose 4 times daily. In the morning fasting and two hours after each meal 22  Yes Hugo Baum, DO   glucose blood VI test strips (ReliOn Prime Test Strips) strip Check blood glucose 4 times daily. In the morning fasting and two hours after each meal 22  Yes Hugo Baum, DO   Blood-Glucose Meter (ReliOn Prime Meter) misc Check blood glucose 4 times daily. In the morning fasting and two hours after each meal 22  Yes Hugo Baum, DO   prenatal multivit-ca-min-fe-fa (PRENATAL VITAMIN) tab Take 1 Tab by mouth daily. 19  Yes Yari Saldivar MD   acetaminophen (Tylenol Extra Strength) 500 mg tablet Take 500 mg by mouth every six (6) hours as needed for Pain. Provider, Historical   aspirin delayed-release 81 mg tablet Take 1 Tablet by mouth daily. 22   Eliana Bryant DO        Review of Systems:  A comprehensive review of systems was negative except for that written in the History of Present Illness. Objective:     Vitals:    Vitals:    22 0001 22 0004 22 0005 22 0007   BP: 131/81  (!) 139/104 132/88   Pulse: 98  (!) 107 (!) 106   Resp:       Temp:       SpO2:  99%        Temp (24hrs), Av.6 °F (37 °C), Min:98.3 °F (36.8 °C), Max:98.8 °F (37.1 °C)    BP  Min: 122/81  Max: 147/68     Physical Exam:  Patient with distress.   Heart: Regular rate and rhythm, S1S2 present or without murmur or extra heart sounds  Lung: clear to auscultation throughout lung fields, no wheezes, no rales, no rhonchi and normal respiratory effort  Back: costovertebral angle tenderness absent  Abdomen: soft, nontender  Fundus: soft and non tender  Perineum: blood absent, amniotic fluid present  Cervical Exam: 3/100/-3  Lower Extremities:  - Edema No   - No evidence of DVT seen on physical exam.     Membranes:  Spontaneous Rupture of Membranes; Amniotic Fluid: clear fluid  Uterine Activity:  Frequency: Every 7 minutes   Fetal Heart Rate:  Reactive  Baseline: 145 per minute  Variability: moderate  Accelerations: yes  Decelerations: none       Lab/Data Review:  Recent Results (from the past 24 hour(s))   PH BY NITRAZINE, POC    Collection Time: 05/28/22  2:10 PM   Result Value Ref Range    pH-Nitrazine paper 5 5.0 - 6.1    Daily QC performed? Yes    RUPTURE OF FETAL MEMBRANES, POC    Collection Time: 05/28/22  2:15 PM   Result Value Ref Range    Rupture of fetal membrane Negative Negative    Control line present? Acceptable     Internal negative control Acceptable     Kit Lot No. [id number]     Expiration date 01/31/2024    GLUCOSE, POC    Collection Time: 05/28/22  3:06 PM   Result Value Ref Range    Glucose (POC) 93 65 - 117 mg/dL    Performed by Sita, POC    Collection Time: 05/28/22  9:56 PM   Result Value Ref Range    Rupture of fetal membrane Negative Negative    Control line present? Acceptable     Internal negative control Acceptable     Kit Lot No. [id number]     Expiration date 0,616,516    CBC WITH AUTOMATED DIFF    Collection Time: 05/28/22 11:31 PM   Result Value Ref Range    WBC 12.6 (H) 3.6 - 11.0 K/uL    RBC 4.28 3.80 - 5.20 M/uL    HGB 11.8 11.5 - 16.0 g/dL    HCT 35.8 35.0 - 47.0 %    MCV 83.6 80.0 - 99.0 FL    MCH 27.6 26.0 - 34.0 PG    MCHC 33.0 30.0 - 36.5 g/dL    RDW 13.3 11.5 - 14.5 %    PLATELET 048 683 - 504 K/uL    MPV 12.0 8.9 - 12.9 FL    NRBC 0.0 0  WBC    ABSOLUTE NRBC 0.00 0.00 - 0.01 K/uL    NEUTROPHILS 65 32 - 75 %    LYMPHOCYTES 25 12 - 49 %    MONOCYTES 7 5 - 13 %    EOSINOPHILS 1 0 - 7 %    BASOPHILS 0 0 - 1 %    IMMATURE GRANULOCYTES 2 (H) 0.0 - 0.5 %    ABS. NEUTROPHILS 8.2 (H) 1.8 - 8.0 K/UL    ABS. LYMPHOCYTES 3.2 0.8 - 3.5 K/UL    ABS. MONOCYTES 0.9 0.0 - 1.0 K/UL    ABS. EOSINOPHILS 0.1 0.0 - 0.4 K/UL    ABS. BASOPHILS 0.0 0.0 - 0.1 K/UL    ABS. IMM. GRANS. 0.2 (H) 0.00 - 0.04 K/UL    DF AUTOMATED       Prenatal Labs    O+, ab neg, Hg frac wnl, Hgb 11.9, s/p flu vaccine,  early 1' GTT positive at 225. A1C 5.7. HepC/HIV/RPR/GC/CT/HepB negative. UDS neg. UCx neg. US: normal anatomy. AC and EFW 32% now and HC<5% but within 2 SD). NIPT- low risk male, neg carrier screening      Assessment and Plan:     Ms. Juancho Baez is a 46 y.o.   female with an estimated gestational age of 42w2d who is admitted labor. 1. Labor in s/o LOF, contractions at 37w2d: LOF at 1100 today. Amnisure negative x2 today, however pooling on speculum exam and cervical change noted since last clinic visit rules patient in for early labor.   - Admit L&D  - Vitals per unit routine  - FHT/toco monitoring  - Plan for epidural   - PCN for GBS prophylaxis  - Anticipate     2. GDMA1: good control, see glucose log in media. 3. Asymmetric growth restriction: Resolved. Initially noted on  US, resolved on .     4. Mild L renal pyelectasis: Resolved on  US    5. Hx SAB x2: First at ? 5months (pt reports <20w) and 2nd at 7w (2019) s/p D&C per Dr. Kiya Walter notes. 6. AMA: on ASA for preE prevention    7. Late to care: 1st and 3rd tri UDS neg    I have discussed the diagnosis with the patient and the intended plan as seen in the above orders. All questions were answered concerning future plans. I have discussed medication side effects and warnings with the patient as well. Labor precautions discussed, including: Regular painful contractions, lasting for greater than one hour, taking your breath away; any vaginal bleeding; any leakage of fluid; or absent or decreased fetal movement. Call M.D. on call if any of these symptoms or signs occur.       Patient discussed with Dr. Osmani Garcia (Attending)    Braden Lott DO  Family Medicine Resident

## 2022-05-29 NOTE — L&D DELIVERY NOTE
Delivery Summary    Patient: Tomi Favre MRN: [id number]  SSN: [id number]    YOB: 1980  Age: 46 y.o. Sex: female       Patient progressed well to C/C/+2 and pushed for approximately 40 min w/  over a 1st degree perineal laceration. Delivery of a liveborn male infant, Apgar scores were 9/9. Head delivered slightly MATHEW. Loose nuchal cord x 1, manually reduced. Anterior right shoulder delivered w/ single maternal effort w/ posterior shoulder and body following easily. Infant placed on maternal abdomen. Delayed cord clamping x 1 min. Cord clamped x 2 and cut by FOB. Pitocin infusion initiated. Placenta delivered spontaneously intact w/ 3 v cord. Perineum inspected. Fundus firm at U. Mother and baby bonding in LDR. Delivery . Information for the patient's : Teena Garg [facility] [[id number]]       Labor Events:    Labor: No    Steroids: None   Cervical Ripening Date/Time:       Cervical Ripening Type: None   Antibiotics During Labor: Yes   Rupture Identifier:      Rupture Date/Time:       Rupture Type: SROM   Amniotic Fluid Volume:  Moderate    Amniotic Fluid Description:      Amniotic Fluid Odor:      Induction: None       Induction Date/Time:        Indications for Induction:      Augmentation: None   Augmentation Date/Time:      Indications for Augmentation:     Labor complications: None       Additional complications:        Delivery Events:  Indications For Episiotomy:     Episiotomy: None   Perineal Laceration(s):     Repaired:     Periurethral Laceration Location:      Repaired:     Labial Laceration Location:     Repaired:     Sulcal Laceration Location:     Repaired:     Vaginal Laceration Location:     Repaired:     Cervical Laceration Location:     Repaired:     Repair Suture:     Number of Repair Packets:     Estimated Blood Loss (ml):  ml   Quantitative Blood Loss (ml)                Delivery Date: 2022    Delivery Time: 2:55 AM  Delivery Type: Vaginal, Spontaneous  Sex:  Male    Gestational Age: 44w3d   Delivery Clinician:  Jennifer Madden  Living Status: Living   Delivery Location: L&D room 207          APGARS  One minute Five minutes Ten minutes   Skin color: 1   1        Heart rate: 2   2        Grimace: 2   2        Muscle tone: 2   2        Breathin   2        Totals: 9   9            Presentation: Vertex    Position: Left Occiput Anterior  Resuscitation Method:  Suctioning-bulb; Tactile Stimulation     Meconium Stained: None      Cord Information: 3 Vessels  Complications: Nuchal Cord Without Compressions  Cord around: head  Delayed cord clamping? Yes  Cord clamped date/time:2022  2:56 AM  Disposition of Cord Blood: Lab    Blood Gases Sent?: No    Placenta:  Date/Time: 2022  3:00 AM  Removal: Expressed      Appearance: Normal;Intact     Spencer Measurements:  Birth Weight:        Birth Length:        Head Circumference:        Chest Circumference:       Abdominal Girth: Other Providers:   [employer];YAAKOV CASTELLANO;JEANIE SAMUEL;HECTOR NUNEZ;THOM SCOTT;LUIS F TONY, Obstetrician;Primary Nurse;Primary Spencer Nurse;Staff Nurse;Resident;Nursery Nurse           Group B Strep: No results found for: GRBSEXT, GRBSEXT, GRBSEXT  Information for the patient's : Cher Blanca, Male Saul Felt [[id number]]   No results found for: ABORH, PCTABR, PCTDIG, BILI, ABORHEXT, ABORH     No results for input(s): PCO2CB, PO2CB, HCO3I, SO2I, IBD, PTEMPI, SPECTI, PHICB, ISITE, IDEV, IALLEN in the last 72 hours.

## 2022-05-29 NOTE — PROGRESS NOTES
Labor Progress Note  Patient seen, fetal heart rate and contraction pattern evaluated, patient examined. Patient has much nausea and vomiting and pain  Visit Vitals  /67   Pulse (!) 105   Temp 98.3 °F (36.8 °C)   Resp 16   SpO2 99%       Physical Exam:    46 y.o.  in pain. Cervical Exam:   Presentation Vertex  Dilation 6 cms   Effacement 100 %   Station 0  Membranes: Prelabor rupture of membranesClear  Uterine Activity:   Frequency: Every 2 minutes   Duration: 60 seconds   Intensity: Strong  Fetal Heart Rate:    Baseline: 150 bpm   Variability: Moderate   Accelerations: Present (15 x 15 bpm)   Decelerations: None  Category: 1        Recent Results (from the past 12 hour(s))   PH BY NITRAZINE, POC    Collection Time: 22  2:10 PM   Result Value Ref Range    pH-Nitrazine paper 5 5.0 - 6.1    Daily QC performed? Yes    RUPTURE OF FETAL MEMBRANES, POC    Collection Time: 22  2:15 PM   Result Value Ref Range    Rupture of fetal membrane Negative Negative    Control line present? Acceptable     Internal negative control Acceptable     Kit Lot No. [id number]     Expiration date 2024    GLUCOSE, POC    Collection Time: 22  3:06 PM   Result Value Ref Range    Glucose (POC) 93 65 - 117 mg/dL    Performed by Sita, POC    Collection Time: 22  9:56 PM   Result Value Ref Range    Rupture of fetal membrane Negative Negative    Control line present?  Acceptable     Internal negative control Acceptable     Kit Lot No. [id number]     Expiration date 1,312,024    CBC WITH AUTOMATED DIFF    Collection Time: 22 11:31 PM   Result Value Ref Range    WBC 12.6 (H) 3.6 - 11.0 K/uL    RBC 4.28 3.80 - 5.20 M/uL    HGB 11.8 11.5 - 16.0 g/dL    HCT 35.8 35.0 - 47.0 %    MCV 83.6 80.0 - 99.0 FL    MCH 27.6 26.0 - 34.0 PG    MCHC 33.0 30.0 - 36.5 g/dL    RDW 13.3 11.5 - 14.5 %    PLATELET 720 670 - 949 K/uL    MPV 12.0 8.9 - 12.9 FL    NRBC 0.0 0  WBC ABSOLUTE NRBC 0.00 0.00 - 0.01 K/uL    NEUTROPHILS 65 32 - 75 %    LYMPHOCYTES 25 12 - 49 %    MONOCYTES 7 5 - 13 %    EOSINOPHILS 1 0 - 7 %    BASOPHILS 0 0 - 1 %    IMMATURE GRANULOCYTES 2 (H) 0.0 - 0.5 %    ABS. NEUTROPHILS 8.2 (H) 1.8 - 8.0 K/UL    ABS. LYMPHOCYTES 3.2 0.8 - 3.5 K/UL    ABS. MONOCYTES 0.9 0.0 - 1.0 K/UL    ABS. EOSINOPHILS 0.1 0.0 - 0.4 K/UL    ABS. BASOPHILS 0.0 0.0 - 0.1 K/UL    ABS. IMM.  GRANS. 0.2 (H) 0.00 - 0.04 K/UL    DF AUTOMATED       Assessment/Plan:  Term PROM  Labor is progressing quickly   Aroldo Mojica MD  5/29/2022

## 2022-05-29 NOTE — ANESTHESIA PREPROCEDURE EVALUATION
Relevant Problems   ENDOCRINE   (+) GDM, class A1   (+) Maternal obesity affecting pregnancy, antepartum       Anesthetic History   No history of anesthetic complications            Review of Systems / Medical History  Patient summary reviewed, nursing notes reviewed and pertinent labs reviewed    Pulmonary  Within defined limits                 Neuro/Psych   Within defined limits           Cardiovascular  Within defined limits                Exercise tolerance: >4 METS     GI/Hepatic/Renal  Within defined limits              Endo/Other    Diabetes    Obesity     Other Findings              Physical Exam    Airway  Mallampati: II    Neck ROM: normal range of motion   Mouth opening: Normal     Cardiovascular  Regular rate and rhythm,  S1 and S2 normal,  no murmur, click, rub, or gallop  Rhythm: regular  Rate: normal         Dental  No notable dental hx       Pulmonary  Breath sounds clear to auscultation               Abdominal  GI exam deferred       Other Findings            Anesthetic Plan    ASA: 2  Anesthesia type: epidural      Post-op pain plan if not by surgeon: indwelling epidural catheter      Anesthetic plan and risks discussed with: Patient      Late entry - preop done, questions answered, and consent obtained prior to procedure; note entered after procedure at 12:07 AM

## 2022-05-29 NOTE — ANESTHESIA PROCEDURE NOTES
Epidural Block    Patient location during procedure: OB  Start time: 5/29/2022 11:57 PM  End time: 5/29/2022 12:08 AM  Reason for block: labor epidural  Staffing  Performed: attending   Anesthesiologist: Raheel Neal MD  Preanesthetic Checklist  Completed: patient identified, risks and benefits discussed and timeout performed  Block Placement  Patient position: sitting  Prep: Betadine  Sterility prep: cap, drape, gloves, gown, hand and mask  Sedation level: no sedation  Patient monitoring: continuous pulse oximetry and heart rate  Approach: midline  Location: lumbar  Lumbar location: L3-L4  Epidural  Loss of resistance technique: air  Guidance: landmark technique  Needle  Needle type: Tuohy   Needle gauge: 17 G  Needle length: 9 cm  Catheter type: multi-orifice  Catheter size: 20 G  Catheter securement method: surgical tape  Test dose: negative  Assessment  Number of attempts: 1  Procedure assessment: patient tolerated procedure well with no immediate complications  Additional Notes  Catheter left in epidural space 5 cm,  12    cm at skin

## 2022-05-29 NOTE — PROGRESS NOTES
[address] [facility] [address]   Office [phone], Fax [phone]                                Post-Partum Day Number 1 Progress Note    Patient doing well post-partum without significant complaint. Pain well controlled. Lochia minimal.  Tolerating regular diet. Ambulating. Voiding without difficulty. Passing flatus. Vitals:  No data found. Temp (24hrs), Av.3 °F (36.8 °C), Min:98.2 °F (36.8 °C), Max:98.3 °F (36.8 °C)      Exam:  Patient without distress. CTAB, no w/r/r/c.               RRR, +S1 and S2, no m/r/g. Abdomen soft, fundus firm at level of umbilicus, nontender. Lower extremities:  No edema. No palpable cords or tenderness. Lab/Data Review:  No results found for this or any previous visit (from the past 12 hour(s)). Assessment and Plan:    Merlinda Petty is a 46 y.o. [id number] s/p  at 37 weeks 3 days. Pregnancy was complicated by AMA, GDMA1, hx of SABx2, late to care. Patient appears to be having uncomplicated post-partum course. 1. Continue routine perineal care and maternal education. 2. Plan discharge tomorrow if no problems occur. 3. Elevated blood pressure: Continue to monitor  4. GDMA1: Will need 6 week 2h GTT testing     Patient discussed with Dr. Yen Giordano.                  Sanna Curtis MD  Family Medicine Resident

## 2022-05-29 NOTE — PROGRESS NOTES
9:46 PM  Patient presents with a complaint of leaking of fluid and pain in her lower back and abdomen. Patient placed on the monitor. Assumed care at this time. 9:56 PM  Amnisure performed  10:11 PM  Spoke to the resident on and informed her of the patient. 11:14 PM  Dr. Valdemar Estrella at the bedside to see the patient, review the tracing and discuss the plan of care. 11:16 PM  Dr. Valdemar Esterlla performing a speculum exam. SROM confirmed. 11:42 PM  Using Varun Bowden # [id number], I had the patient sign her consent forms. 11:54 PM  Patient sitting on the side of the bed for epidural placement. Attempting to continuously trace the FHR. Dr. Sylvain Triana at the bedside to discuss and place. 2:19 AM  Patient checked and complete. Start pushing per Dr. Valdemar Estrella. 2:24 AM  Using [employer] # [id number], the patient was instructed on pushing. Lyle Weston remained on the line while pushing and through delivery. 5:50 AM  Patient ambulated to the bathroom with assistance. Patient voided and balwinder care done. 6:05 AM  Patient transferred to MIU room 305. Bedside SBAR report given to NADINE Osorio RN. Care of the patient turned over at this time. Infant ID bands verified.

## 2022-05-30 PROCEDURE — 65270000029 HC RM PRIVATE

## 2022-05-30 PROCEDURE — 74011250637 HC RX REV CODE- 250/637

## 2022-05-30 RX ORDER — IBUPROFEN 800 MG/1
800 TABLET ORAL EVERY 8 HOURS
Qty: 30 TABLET | Refills: 0 | Status: SHIPPED | OUTPATIENT
Start: 2022-05-30 | End: 2022-07-11

## 2022-05-30 RX ORDER — DOCUSATE SODIUM 100 MG/1
100 CAPSULE, LIQUID FILLED ORAL 2 TIMES DAILY
Qty: 40 CAPSULE | Refills: 0 | Status: SHIPPED | OUTPATIENT
Start: 2022-05-30 | End: 2022-06-19

## 2022-05-30 RX ADMIN — IBUPROFEN 800 MG: 800 TABLET, FILM COATED ORAL at 20:50

## 2022-05-30 RX ADMIN — DOCUSATE SODIUM 100 MG: 100 CAPSULE, LIQUID FILLED ORAL at 18:37

## 2022-05-30 RX ADMIN — IBUPROFEN 800 MG: 800 TABLET, FILM COATED ORAL at 10:26

## 2022-05-30 RX ADMIN — DOCUSATE SODIUM 100 MG: 100 CAPSULE, LIQUID FILLED ORAL at 07:44

## 2022-05-30 NOTE — PROGRESS NOTES
NUTRITION    Best practice alert was triggered based on results obtained during nursing admission assessment for pregnant  The patient's chart was reviewed and nutrition assessment is not indicated at this time. Patient has since given birth. Plan to see patient for rescreen per policy. Thank you.      Christal Marmolejo RD  Contact via [address] [place] [address] or office [phone]

## 2022-05-30 NOTE — PROGRESS NOTES
Spiritual Care Partner Volunteer visited patient at [address] [facility] in [facility] [address] on 5/30/2022   Documented by:  NADINE HatfieldDiv.  [phone])

## 2022-05-30 NOTE — DISCHARGE SUMMARY
Obstetrical Discharge Summary     Name: Racquel Alegria MRN: [id number]  SSN: [id number]    YOB: 1980  Age: 46 y.o. Sex: female      Admit Date: 2022    Discharge Date: 2022     Admitting Physician: Ramakrishna Osborn MD     Attending Physician:  Darian Molina DO     Admission Diagnoses: Pregnancy [Z34.90]    Discharge Diagnoses:   Information for the patient's : Matthew Babb, Male Filiberto Vuong [[id number]]   Delivery of a 3.1 kg male infant via Vaginal, Spontaneous on 2022 at 2:55 AM  by Ramakrishna Osborn. Apgars were 9  and 9 . Additional Diagnoses:   Hospital Problems  Date Reviewed: 2022          Codes Class Noted POA    Pregnancy ICD-10-CM: Z34.90  ICD-9-CM: V22.2  2022 Unknown           No results found for: RUBELLAEXT, GRBSEXT, RUBELLAEXT, GRBSEXT    Immunization(s):   Immunization History   Administered Date(s) Administered    Influenza Vaccine Robin) PF (>6 Mo Flulaval, Fluarix, and [facility] 20[facility]    Tdap 2022        Hospital Course:   Patient is a 46 y.o. [id number] s/p FAVD at 37 weeks 3 days. Presented for  Active Labor. Pregnancy complicated by GDMA1, AMA, late to care. Labor was uncomplicated. Delivered TLMI by Dr. Morgan Moran without complications. Normal hospital course following the delivery. On day of discharge patient reported minimal lochia, well controlled pain, and no other complaints. Discharged with pain regimen and bowel regimen. Advised to continue prenatal vitamins. Follow up with Dr. Rivera Tena office in 6 weeks. 1. Elevated blood pressure: two readings 5 hours apart around time of delivery, taken during contractions. Unable to obtain PCr but CBC and CMP wnl. Remainder of BP have been wnl for >24h.    2. GDMA1: Will need 2h GTT at 6 week f/u       Depression Scale: Score 1    Follow up test at discharge: 6 week 2h GTT test    Condition at Discharge:  Stable  Disposition: Discharge to Home    Physical exam:  Visit Vitals  /79 (BP 1 Location: Left upper arm, BP Patient Position: At rest)   Pulse 76   Temp 98.4 °F (36.9 °C)   Resp 18   Ht 5' 2\" (1.575 m)   Wt 98.9 kg (218 lb)   LMP  (LMP Unknown)   SpO2 98%   Breastfeeding Unknown   BMI 39.87 kg/m²       Exam:  Patient without distress. CTAB, no w/r/r/c               RRR, + S1 and S2, no m/r/g               Abdomen soft, fundus firm at level of umbilicus, non tender               Lower extremities are negative for swelling, cords or tenderness. Patient Instructions:   Current Discharge Medication List      START taking these medications    Details   docusate sodium (COLACE) 100 mg capsule Take 1 Capsule by mouth two (2) times a day for 20 days. Qty: 40 Capsule, Refills: 0  Start date: 5/30/2022, End date: 6/19/2022      ibuprofen (MOTRIN) 800 mg tablet Take 1 Tablet by mouth every eight (8) hours. Qty: 30 Tablet, Refills: 0  Start date: 5/30/2022         CONTINUE these medications which have NOT CHANGED    Details   prenatal multivit-ca-min-fe-fa (PRENATAL VITAMIN) tab Take 1 Tab by mouth daily. Qty: 90 Tab, Refills: 4      acetaminophen (Tylenol Extra Strength) 500 mg tablet Take 500 mg by mouth every six (6) hours as needed for Pain. aspirin delayed-release 81 mg tablet Take 1 Tablet by mouth daily. Qty: 30 Tablet, Refills: 5    Associated Diagnoses: Multigravida of advanced maternal age in first trimester; Maternal obesity affecting pregnancy, antepartum         STOP taking these medications       lancets misc Comments:   Reason for Stopping:         glucose blood VI test strips (ReliOn Prime Test Strips) strip Comments:   Reason for Stopping:         Blood-Glucose Meter (ReliOn Prime Meter) misc Comments:   Reason for Stopping:                 Reference my discharge instructions.     Follow-up Information     Follow up With Specialties Details Why Contact Aron Son DO Family Medicine  Danyel Soto kait de la des.  [address] [place] [address]  [phone]              Signed By:  Micky Meeks MD    Family Medicine Resident

## 2022-05-31 VITALS
WEIGHT: 218 LBS | RESPIRATION RATE: 16 BRPM | HEART RATE: 73 BPM | DIASTOLIC BLOOD PRESSURE: 83 MMHG | TEMPERATURE: 98.1 F | HEIGHT: 62 IN | OXYGEN SATURATION: 98 % | BODY MASS INDEX: 40.12 KG/M2 | SYSTOLIC BLOOD PRESSURE: 123 MMHG

## 2022-05-31 PROCEDURE — 74011250637 HC RX REV CODE- 250/637

## 2022-05-31 PROCEDURE — 99238 HOSP IP/OBS DSCHRG MGMT 30/<: CPT | Performed by: FAMILY MEDICINE

## 2022-05-31 PROCEDURE — 2709999900 HC NON-CHARGEABLE SUPPLY

## 2022-05-31 RX ADMIN — IBUPROFEN 800 MG: 800 TABLET, FILM COATED ORAL at 04:52

## 2022-05-31 RX ADMIN — IBUPROFEN 800 MG: 800 TABLET, FILM COATED ORAL at 13:04

## 2022-05-31 NOTE — ROUTINE PROCESS
Bedside and Verbal shift change report given to Pat De Santiago (oncoming nurse) by GABRIEL Khan (offgoing nurse). Report included the following information SBAR, Procedure Summary, Intake/Output, MAR, Accordion, Recent Results and Med Rec Status.

## 2022-05-31 NOTE — DISCHARGE INSTRUCTIONS
Patient Discharge Instructions    Tomi Favre / [id number] : 1980    Admitted 2022 Discharged: 2022       Por favor tenga patricia documento presente en watson des de seguimiento con watson médico primario. Primary care provider:  None    Discharging provider:  Aguila Garcia MD  - Family Medicine Resident  Vandana Macias DO -  Family medicine attending          [address]:  · Pregnancy [Z34.90]      RECOMENDACIONES DE CUIDADO:     Follow-up Information     Follow up With Specialties Details Why Contact Info    Priyanka Shine DO Family Medicine  Le llamaremos con la fecha y hora de [place] [address]  [phone]            Continue Tratamiento:  - Por favor continue Motrin 800 mg, 1 tableta cada 8 horas por los próximos 2 días, luego 1 tableta cada 8 horas mientras sea necesario para el dolor.  - Por favor continue Colace 100 mg para el estreñimiento, tome 1 tableta dos veces al día hasta que pueda defercar regularmente sin necesidad del medicamento. - Por favor continue tomando linda vitaminas prenatales. Pruebas de seguimiento que necesita: Prueba de diabetes en 6 semanas. Resultados pendientes:   En el momento de watson de faheem los [address] de las siguientes pruebas están pendiente:  [place]. Por favor discuta estos resultados con watson proveedor primario en watson des de seguimiento. Importante que [employer] siguientes síntomas: dolor de [place], falta de [place], [place], escalofríos, náusea, vómito, diarrea, cambios en watson estado mental, caídas, debilidad y sangrado. DIETA/que comer:  Regular    ACTIVIDAD FÍSICA:   Instrucciones de [facility]    No levante nada que sea más pesado que watson bebé por las próximas 6 semanas. No insertar nada por la vaginal por 6 semanas. Luego del parto por cesárea/ vaginal debe evitar tener relaciones sexuales por 6 semanas. Tendrá watson des de seguimiento posparto a las 6 semanas.  Puede manejar watson vehículo mientras no esté tomando Percocet ni ningún medicamento nárcotico (Motrin está rosaline). Cuidado de Herida:  llene el balwinder bottle con agua tibia y exprima hasta que salga un chorro de agua por la boquilla para ayudarle a limpiar el área perineal.      Entiendo que si surge algún problema cuando llegue a mi hogar puedo contactar a mi médico.    Me kwon explicado las siguientes instrucciones y kwon aclarado mis dudas. Entiendo y reconozco las instrucciones brindadas.                                                                                                                                                Firma de Stanley Sands / Meilna Bene                                                                 Fecha/Hora                                                                                                                                              Firma de paciente ó representante                                                         Fecha/Hora

## 2022-05-31 NOTE — LACTATION NOTE
This note was copied from a baby's chart. Mother is breastfeeding and formula feeding her baby. Baby last fed at 1000 and took 35 ml of formula. Instructed mother to pump if baby does not breastfeed for a feeding to help stimulate her breast milk supply. Reviewed storage and preparation of expressed breast milk. Discussed with mother her plan for feeding. Reviewed the benefits of exclusive breast milk feeding during the hospital stay. Informed her of the risks of using formula to supplement in the first few days of life as well as the benefits of successful breast milk feeding; referred her to the Breastfeeding booklet about this information. She acknowledges understanding of information reviewed and states that it is her plan to breast/formula feed her infant. Will support her choice and offer additional information as needed. Reviewed breastfeeding basics:  Supply and demand,  stomach size, early  Feeding cues, skin to skin, positioning and baby led latch-on, assymetrical latch with signs of good, deep latch vs shallow, feeding frequency and duration, and log sheet for tracking infant feedings and output. Breastfeeding Booklet and Warm line information given. Discussed typical  weight loss and the importance of infant weight checks with pediatrician 1-2 post discharge. Discussed eating a healthy diet. Instructed mother to eat a variety of foods in order to get a well balanced diet. She should consume an extra 500 calories per day (more than her non-pregnant requirement.) These extra calories will help provide energy needed for optimal breast milk production. Mother also encouraged to \"drink to thirst\" and it is recommended that she drink fluids such as water, fruit/vegetable juice. Nutritious snacks should be available so that she can eat throughout the day to help satisfy her hunger and maintain a good milk supply.      Care for sore/tender nipples discussed:  ways to improve positioning and latch practiced and discussed, hand express colostrum after feedings and let air dry, light application of lanolin, hydrogel pads, seek comfortable laid back feeding position, start feedings on least sore side first.    Engorgement Care Guidelines:  Reviewed how milk is made and normal phases of milk production. Taught care of engorged breasts - frequent breastfeeding encouraged, cool packs and motrin as tolerated. Anticipatory guidance shared. Mother will successfully establish breastfeeding by feeding in response to early feeding cues   or wake every 3h, will obtain deep latch, and will keep log of feedings/output. Taught to BF at hunger cues and or q 2-3 hrs and to offer 10-20 drops of hand expressed colostrum at any non-feeds. Breast Assessment  Left Breast: Medium  Left Nipple: Everted,Intact  Right Breast: Medium  Right Nipple: Everted,Intact  Breast- Feeding Assessment  Breast-Feeding Experience: Yes (Breast fed other babies x 2 years. No problems)  Breast Trauma/Surgery: No  Type/Quality: Good  Lactation Consultant Visits  Breast-Feedings:  (Baby offered breast last feeding but was too sleepy. Mother supplemented with formula - baby took 35 ml. He is under triple phototherapy. Encouraged mom to pump if baby too sleepy to nurse.)  Mother/Infant Observation  Infant Observation: Frenulum checked    Chart shows numerous feedings, void, stool WNL. Discussed importance of monitoring outputs and feedings on first week of life. Discussed ways to tell if baby is  getting enough breast milk, ie  voids and stools, change in color of stool, and return to birth wt within 2 weeks. Follow up with pediatrician visit for weight check in 1-2 days (per AAP guidelines.)  Encouraged to call Warm Line  [phone]  for any questions/problems that arise.  Mother also given breastfeeding support group dates and times for any future needs

## 2022-07-11 ENCOUNTER — OFFICE VISIT (OUTPATIENT)
Dept: FAMILY MEDICINE CLINIC | Age: 42
End: 2022-07-11

## 2022-07-11 VITALS
HEART RATE: 76 BPM | HEIGHT: 62 IN | RESPIRATION RATE: 18 BRPM | OXYGEN SATURATION: 98 % | SYSTOLIC BLOOD PRESSURE: 119 MMHG | DIASTOLIC BLOOD PRESSURE: 83 MMHG | WEIGHT: 196 LBS | BODY MASS INDEX: 36.07 KG/M2 | TEMPERATURE: 97.3 F

## 2022-07-11 DIAGNOSIS — N92.0 MENORRHAGIA WITH REGULAR CYCLE: ICD-10-CM

## 2022-07-11 DIAGNOSIS — O24.410 GDM, CLASS A1: Primary | ICD-10-CM

## 2022-07-11 PROCEDURE — 0503F POSTPARTUM CARE VISIT: CPT

## 2022-07-11 RX ORDER — NORGESTIMATE AND ETHINYL ESTRADIOL 0.25-0.035
1 KIT ORAL DAILY
Qty: 4 DOSE PACK | Refills: 3 | Status: SHIPPED | OUTPATIENT
Start: 2022-07-11

## 2022-07-11 NOTE — PROGRESS NOTES
Postpartum Note    46 y.o. female [id number] status post FAVD on 5/29/22, presenting for postpartum visit. Patient doing well post-partum without significant complaint. Lochia: normal, resolved  Pain: controlled, still has some in her lower back   Baby: doing well, has seen PCP  Sexual activity:   Plan for contraception:  Symptoms of depression: None  Breast/bottle: formula feeding  Support from FOB/family: Yes    Wrist Pain:   - Over radial aspect of both wrists, present since just after delivery. Present when lifting child, worse with radial motion. Vitals:    Visit Vitals  /83 (BP 1 Location: Left upper arm, BP Patient Position: Sitting, BP Cuff Size: Large adult)   Pulse 76   Temp 97.3 °F (36.3 °C) (Temporal)   Resp 18   Ht 5' 2\" (1.575 m)   Wt 196 lb (88.9 kg)   LMP  (LMP Unknown)   SpO2 98%   BMI 35.85 kg/m²       Exam:  Patient without distress. Abdomen soft, fundus firm at level of umbilicus, nontender. Lower extremities:  No edema. No palpable cords or tenderness. Assessment and Plan:    Adina Richard is a 46 y.o. [id number] s/p FAVD at 37 weeks 3 days. Patient having uncomplicated post-partum course. 1. Continue routine care  2. Call clinic or make appointment for symptoms of sadness  3. Follow up for yearly well woman exam.    4. Contraception: Would like to use the pill, has used in the past. Sprintec for menorrhagia. No smoking or blood clot history, no sex since delivery. 5. Discussed de Quervain tendonopathy post partum, including altering activity that worsens pain, altered technique for picking up infant, rest and ice to area, and OTC brace. Patient can return for possible injection if symptoms fail to improve with conservative management.                   Erasmo Jimenez MD

## 2022-07-11 NOTE — PATIENT INSTRUCTIONS
Etinilestradiol/norgestimato (Por la boca)   Se [place] para evitar el embarazo y tratar el acné. Ynes medicamento comúnmente se conoce jayesh stephanie píldora anticonceptiva. Oralia(s) : Estarylla, Femynor, Mono-Linyah, MonoNessa, Ortho Tri-Cyclen, Ortho Tri-Cyclen Lo, Ortho-Cyclen, Previfem, Sprintec, Tri Femynor, Tri-Estarylla, Tri-Linyah, Tri-Lo-Estarylla, Tri-Lo-Kristen, Tri-Lo-Sprintec   Existen [employer]. Ynes medicamento no debe ser usado cuando:   Ynes medicamento no es adecuado para todas las personas. No lo use si usted tuvo stephanie reacción alérgica al etinil estradiol o al norgestimato, o si usted está embarazada o tiene sangrado vaginal inusual que no ha sido revisado por watson médico. No lo use si usted tiene enfermedad hepática o cáncer de hígado, o problemas de coagulación de [place] [employer]. No lo use si usted tiene presión arterial faheem, ciertos problemas del corazón, o diabetes con daños en el riñón, los ojos, nervios o vasos sanguíneos. Forma de usar ynes medicamento:   Mahin Bauman  · Watson médico le indicara cuanto medicamento necesita usar. No use más medicamento de lo indicado. · Cada oralia de anticonceptivos orales tiene instrucciones específicas. Remedios y siga las instrucciones para el paciente que vienen con watson oralia prescrita. Hable con watson médico o farmacéutico si tiene alguna pregunta. · Pregunte a watson médico si es necesario que use un nick método anticonceptivo [employer] primeros 7 días del primer ciclo de píldoras. · [place] watson píldora a la misma hora cada día. Las píldoras anticonceptivas funcionan mejor cuando no hay más de 24 horas entre dosis. Mantenga las píldoras en el envase original. [address] píldoras en el orden en que aparecen en el envase. · Siga las instrucciones que vienen en el panfleto para el paciente o llame a watson médico, si usted vomita o si en lapso de 3 a 4 horas usted tiene diarrea después de aaron Ana.   · Si olvida stephanie dosis: Remedios y [employer] instrucciones del paciente cuidadosamente si olvida stephanie dosis. Es posible que necesite usar un nick método anticonceptivo rony 1 [place]. Usted podría tener sangrado ligero o manchado si no agnes stephanie píldora puntualmente. Entre más píldoras deje de aaron, mayor será la posibilidad de presentar un sangrado. · Guarde el medicamento en un recipiente cerrado a temperatura ambiente y alejado del calor, la humedad y la yolande directa. Guarde el medicamento en watson empaque original.  Medicamentos y alimentos que debe evitar:   Consulte con watson médico o farmacéutico antes de usar cualquier medicamento, incluyendo los que compra sin receta médica, las vitaminas y los productos herbales. · No use patricia medicamento junto con la medicina para tratar la infección por el virus de la hepatitis C, incluyendo ombitasvir/paritaprevir/ritonavir, con o sin dasabuvir. · Algunos alimentos y [employer] pueden afectar el funcionamiento de las píldoras anticonceptivas. Informe a watson médico si usted [employer] siguientes medicamentos:   ¨ Acetaminofeno, aprepitant, ácido ascórbico, aspirina, [place], bosentano, clofibrato, colesevelam, ciclosporina, Shira vilage, prednisolona, [address], [address], temazepam, teofilina, tizanidina  ¨ Medicamentos para tratar [employer] infección (incluso griseofulvina, fluconazol, itraconazol, ketoconazol, rifabutina, rifampicina, voriconazol)  ¨ Medicamentos para tratar el VIH/SIDA (incluso amprenavir/ritonavir, atazanavir/ritonavir, boceprevir, darunavir/ritonavir, etravirina, fosamprenavir/ritonavir, indinavir, lopinavir/ritonavir, nelfinavir, nevirapina, ritonavir, telaprevir, tipranavir/ritonavir)  ¨ Medicamentos para tratar convulsiones (incluso carbamazepina, felbamato, lamotrigina, oxcarbazepina, fenitoína, rufinamida, topiramato)  ¨ Medicamento de reemplazo de la tiroides  · No coma toronja ni tome jugo de toronja mientras esté usando patricia medicamento.   Precauciones rony el uso de patricia medicamento:   · Informe a watson médico de inmediato si usted emmanuel que [employer]. Si pasan 2 meses seguidos sin que usted tenga un período menstrual, llame a watson médico para que le realicen stephanie prueba de embarazo antes de aaron más píldoras. · Informe a watson médico si está amamantando, o si miguel a yolande en las últimas 4 semanas antes de comenzar a usar patricia medicamento. Informe a watson médico si usted tiene cáncer de sergio uterino, diabetes, epilepsia, enfermedad de la vesícula biliar, farhat de shanna por migraña, enfermedad cardíaca o vascular, colesterol alto, antecedentes de cloasma gravidarum (decoloración de la piel de la kiara rony el [facility]), depresión, o antecedentes familiares de cáncer de mama. Dígale a watson médico si usted fuma o si tiene [facility] que requiere [facility]Care. · Patricia medicamento puede causar los siguientes problemas:  ¨ Mayor riesgo de sufrir un ataque cardíaco, un derrame cerebral o coágulos en la leonides  ¨ Problemas hepáticos (incluso cáncer o tumores)  ¨ Presión arterial faheem  ¨ Enfermedad de la vesícula  ¨ Nivel elevado de colesterol o grasas en la leonides  ¨ Un posible aumento en el riesgo de cáncer de seno o cervical  · Patricia medicamento no la protegerá contra el VIH / Sandria Mulders u otras enfermedades de transmisión sexual.  · Es posible que usted presente manchas o sangrado irregular cuando empiece a usar patricia medicamento. Usted puede tener sangrado imprevisto si olvida aaron stephanie dosis o si agnes stephanie dosis tarde. Sin embargo, si usted tiene sangrado intenso, llame a watson médico.  · Si se retrasa watson alban dos periodos seguidos, llame a watson médico para que le realice stephanie prueba de embarazo antes de aaron más píldoras. · Dígale a todo médico o dentista encargado de atenderle que usted está usando [place]. Es posible que tenga que suspender el uso de patricia medicamento varios días antes de someterse a stephanie cirugía o exámenes médicos.   · Dígale a todo médico o dentista encargado de atenderle que usted está usando patricia medicamento. Puede que patricia medicamento afecte algunos resultados de [email or web address]. · Watson médico tendrá que revisar watson progreso y los efectos de patricia medicamento rony linda citas regulares. Cumpla sin falta con todas linda citas médicas. Asista a todas linda citas. · Guarde todos los medicamentos fuera del alcance de los niños. Nunca comparta linda medicamentos con Handle. Efectos secundarios que pueden presentarse rony el uso de patricia medicamento:   Consulte inmediatamente con el médico si nota cualquiera de estos efectos secundarios:  · Reacción alérgica: Comezón o ronchas, hinchazón del ramakrishna o las anthony, hinchazón u hormigueo en la boca o garganta, opresión en el pecho, dificultad para respirar  · Ampollas, despellejamiento, sarpullido gurrola en la piel. · Protuberancias, sensibilidad, dolor, hinchazón o secreción en las mamas  · Dolor en el pecho u opresión, dificultad para respirar o tos con leonides  · Adormecimiento o debilidad en un lado del cuerpo, dolor de shanna repentino o severo, problemas con el habla, la visión o para caminar  · Dolor en la parte inferior de la pierna (pantorrilla)  · Dolor de estómago súbito e intenso, náuseas, vómito, desvanecimientos  · Sangrado vaginal o sangrado profuso inusual e inesperado  · Sudoración inusual, desmayos  · Pérdida de la visión, visión doble  · Piel u ojos amarillos  Consulte con el médico si nota los siguientes efectos secundarios menos graves:   · Piel oscurecida en la kiara  · Depresión, cambios de humor  · Gricel de shanna  · Sangrado ligero o sangrado entre períodos  Consulte con el médico si nota otros efectos secundarios que emmanuel son causados por patricia medicamento. Llame a watson médico para consultarle [employer]. Usted puede notificar linda efectos secundarios al FDA al [id number].   © 2017 [facility] Merrill Freeman Information is for End User's use only and may not be sold, redistributed or otherwise used for commercial purposes. Esta información es sólo para uso en educación. Watson intención no es darle un consejo médico sobre enfermedades o tratamientos. Colsulte con watson Elray Krishna farmacéutico antes de seguir cualquier régimen médico para saber si es seguro y efectivo para usted.

## 2022-07-11 NOTE — PROGRESS NOTES
Jeff Rojas is a 46 y.o. female    Chief Complaint   Patient presents with   [address]     had baby 5/28/22. formula feeding.  Wrist Pain     b/l wrist pain since delivery. L>R     Not fasting today. 1. \"Have you been to the ER, urgent care clinic since your last visit? Hospitalized since your last visit? \" Yes - hospitalized in may for delivery    2. \"Have you seen or consulted any other health care providers outside of the [address] [place] [address] since your last visit? \" No     3. For patients aged 39-70: Has the patient had a colonoscopy / FIT/ Cologuard? NA - based on age      If the patient is female:    4. For patients aged 41-77: Has the patient had a mammogram within the past 2 years? NA - based on age or sex      11. For patients aged 21-65: Has the patient had a pap smear? No    Vitals:    07/11/22 1334   BP: 119/83   BP 1 Location: Left upper arm   BP Patient Position: Sitting   BP Cuff Size: Large adult   Pulse: 76   Temp: 97.3 °F (36.3 °C)   TempSrc: Temporal   Resp: 18   Height: 5' 2\" (1.575 m)   Weight: 196 lb (88.9 kg)   SpO2: 98%             Health Maintenance Due   Topic Date Due    Depression Screen  Never done    COVID-19 Vaccine (1) Never done    Cervical cancer screen  Never done         Medication Reconciliation completed, changes noted.   Please update medication list.

## 2022-07-21 NOTE — PROGRESS NOTES
I have reviewed the notes, assessments, and/or procedures performed by resident, I concur with her/his documentation of Norma Bolden.

## 2022-10-25 ENCOUNTER — OFFICE VISIT (OUTPATIENT)
Dept: FAMILY MEDICINE CLINIC | Age: 42
End: 2022-10-25
Payer: MEDICAID

## 2022-10-25 VITALS
HEART RATE: 81 BPM | WEIGHT: 208.38 LBS | TEMPERATURE: 97.5 F | SYSTOLIC BLOOD PRESSURE: 119 MMHG | OXYGEN SATURATION: 97 % | BODY MASS INDEX: 38.11 KG/M2 | DIASTOLIC BLOOD PRESSURE: 84 MMHG

## 2022-10-25 DIAGNOSIS — Z12.4 SCREENING FOR CERVICAL CANCER: ICD-10-CM

## 2022-10-25 DIAGNOSIS — R10.2 PELVIC PAIN: ICD-10-CM

## 2022-10-25 DIAGNOSIS — H57.13 EYE PAIN, BILATERAL: Primary | ICD-10-CM

## 2022-10-25 DIAGNOSIS — R73.09 ABNORMAL BLOOD SUGAR: ICD-10-CM

## 2022-10-25 PROCEDURE — 99214 OFFICE O/P EST MOD 30 MIN: CPT

## 2022-10-25 RX ORDER — IBUPROFEN 800 MG/1
400 TABLET ORAL
Qty: 30 TABLET | Refills: 0 | Status: SHIPPED | OUTPATIENT
Start: 2022-10-25

## 2022-10-25 NOTE — PROGRESS NOTES
Maria M Vasquez is a 46 y.o. female who presents to clinic today for annual physical exam.    She would also like to address the following issues:       Patient presents with complaints of pain in the eyes bilaterally which has been associated with headache localized to the back of the head since one month. Patient has been experiencing some difficulties with vision. Patient mentions she has been experiencing difficulties with far vision. Patient has noticed some difficulty when watching TV or looking at the phone, patient has noticed that she holds the phone closer to her face. Patient does not drive, so has not noticed any difficulties with driving. Patient does not recall the last time she has seen an eye doctor. A large cyst on the left ovary was found when patient had an ultrasound scan done last year during pregnancy. Patient complains of pain on the left side of the lower abdomen, described the pain as throbbing, intermittent in nature, rates the pain 4/10. At its worst, patient rates the pain 5/10. Patient has no other GI symptoms other than constipation. Patient says this started after her last delivery, has bowel movement every 2 days. Patient also concerned to check her blood sugar levels as she had diet controlled gestational DM during her last pregnancy. Gyn History     Patient's last menstrual period was 2022. Length of periods: 3-4  Menstrual flow: normal  Sexually active with   Method of family planning: hormonal injection every month since 2 months  Any family history of gyn cancers (breast, ovarian or cervical ca)?  No    Diet: eggs, beans, chicken soup, beef soup, avoids sugar  Exercise: walks every day        The Following Histories, Medications, and Allergies were Reviewed and Updated:  Past Medical History:   Diagnosis Date    Gestational diabetes         Current Outpatient Medications on File Prior to Visit   Medication Sig Dispense Refill    norgestimate-ethinyl estradioL (ORTHO-CYCLEN, SPRINTEC) 0.25-35 mg-mcg tab Take 1 Tablet by mouth daily. 4 Dose Pack 3     No current facility-administered medications on file prior to visit. No Known Allergies    Past Surgical History:   Procedure Laterality Date    HX DILATION AND CURETTAGE  10/2018       Social History     Tobacco Use    Smoking status: Never    Smokeless tobacco: Never   Vaping Use    Vaping Use: Never used   Substance Use Topics    Alcohol use: Never    Drug use: Never        No family history on file. Preventative care (USPSTF):        Health Maintenance Due   Topic Date Due    COVID-19 Vaccine (1) Never done    Pneumococcal 0-64 years (1 - PCV) Never done    Cervical cancer screen  Never done    Flu Vaccine (1) 08/01/2022            Objective     BP Readings from Last 3 Encounters:   10/25/22 119/84   07/11/22 119/83   05/31/22 123/83       Vital Signs  Visit Vitals  /84 (BP 1 Location: Left upper arm, BP Patient Position: Sitting, BP Cuff Size: Adult long)   Pulse 81   Temp 97.5 °F (36.4 °C) (Temporal)   Wt 208 lb 6 oz (94.5 kg)   LMP 09/29/2022   SpO2 97%   Breastfeeding No   BMI 38.11 kg/m²       Body mass index is 38.11 kg/m². Physical Examination  Physical Exam  Constitutional:       Appearance: Normal appearance. HENT:      Mouth/Throat:      Mouth: Mucous membranes are moist.      Pharynx: Oropharynx is clear. Cardiovascular:      Rate and Rhythm: Normal rate and regular rhythm. Pulmonary:      Effort: Pulmonary effort is normal.      Breath sounds: Normal breath sounds. Abdominal:      General: Abdomen is flat. Palpations: Abdomen is soft. Neurological:      Mental Status: She is alert. Erwni Calzada is a 46 y.o. female presenting to clinic today for routine annual exam.    Plan   Diagnoses and all orders for this visit:    1. Eye pain, bilateral: since 1 month. Associated with reduced vision. Right eye: 20/200. Left eye: 20/200. Both eyes: 20/50  -     REFERRAL TO OPHTHALMOLOGY    2. Pelvic pain: 7.2 cm left ovarian cyst transvaginal US 12/20/21.   -     ibuprofen (MOTRIN) 800 mg tablet; Take 0.5 Tablets by mouth every eight (8) hours as needed for Pain. Indications: pain  - Will do another US if symptoms continue to persists or gets worse    3. Abnormal blood sugar: A1GDM during last pregnancy. Has not had a postpartum GTT done.   -     HEMOGLOBIN A1C: 5.9% Prediabetes    4. Screening for cervical cancer: Patient does not recall the last time she had screening   -     PAP IG, APTIMA HPV AND RFX 16/18,45 ([id number]); Future       - Counseled diet, exercise, healthy lifestyle  - Return for yearly wellness visits    I discussed the aforementioned diagnoses with the patient as well as the plan of care. All questions were answered and an AVS was provided.      I discussed this patient with Dr. Ermelinda Kearns (Attending Physician)      Signed By:  Avelina Mcpherson MD

## 2022-10-25 NOTE — PROGRESS NOTES
Chief Complaint   Patient presents with    Well Woman    Labs     Check sugar levels     Cyst on her ovaries,didn't remove it  Visit Vitals  /84 (BP 1 Location: Left upper arm, BP Patient Position: Sitting, BP Cuff Size: Adult long)   Pulse 81   Temp 97.5 °F (36.4 °C) (Temporal)   Wt 208 lb 6 oz (94.5 kg)   SpO2 97%   Breastfeeding No   BMI 38.11 kg/m²

## 2022-10-26 LAB
EST. AVERAGE GLUCOSE BLD GHB EST-MCNC: 123 MG/DL
HBA1C MFR BLD: 5.9 % (ref 4–5.6)

## 2022-10-30 LAB
CYTOLOGIST CVX/VAG CYTO: NORMAL
CYTOLOGY CVX/VAG DOC CYTO: NORMAL
CYTOLOGY CVX/VAG DOC THIN PREP: NORMAL
DX ICD CODE: NORMAL
HPV GENOTYPE REFLEX: NORMAL
HPV I/H RISK 4 DNA CVX QL PROBE+SIG AMP: NEGATIVE
Lab: NORMAL
OTHER STN SPEC: NORMAL
STAT OF ADQ CVX/VAG CYTO-IMP: NORMAL

## 2022-11-28 ENCOUNTER — OFFICE VISIT (OUTPATIENT)
Dept: FAMILY MEDICINE CLINIC | Age: 42
End: 2022-11-28

## 2022-11-28 ENCOUNTER — TELEPHONE (OUTPATIENT)
Dept: FAMILY MEDICINE CLINIC | Age: 42
End: 2022-11-28

## 2022-11-28 VITALS
HEART RATE: 71 BPM | DIASTOLIC BLOOD PRESSURE: 71 MMHG | SYSTOLIC BLOOD PRESSURE: 114 MMHG | OXYGEN SATURATION: 96 % | BODY MASS INDEX: 38.28 KG/M2 | RESPIRATION RATE: 18 BRPM | TEMPERATURE: 98.7 F | HEIGHT: 62 IN | WEIGHT: 208 LBS

## 2022-11-28 DIAGNOSIS — N83.202 CYST OF LEFT OVARY: ICD-10-CM

## 2022-11-28 DIAGNOSIS — N92.6 MISSED MENSES: Primary | ICD-10-CM

## 2022-11-28 LAB
HCG URINE, QL. (POC): NEGATIVE
VALID INTERNAL CONTROL?: YES

## 2022-11-28 NOTE — PROGRESS NOTES
Identified Patient with two Patient identifiers (Name and ). Two Patient Identifiers confirmed. Reviewed record in preparation for visit and have obtained necessary documentation. No chief complaint on file. Visit Vitals  /71 (BP 1 Location: Right arm, BP Patient Position: Sitting, BP Cuff Size: Large adult)   Pulse 71   Temp 98.7 °F (37.1 °C) (Oral)   Resp 18   Ht 5' 2\" (1.575 m)   Wt 208 lb (94.3 kg)   SpO2 96%   BMI 38.04 kg/m²       1. Have you been to the ER, urgent care clinic since your last visit? Hospitalized since your last visit? No    2. Have you seen or consulted any other health care providers outside of the [address] [place] [address] since your last visit? Include any pap smears or colon screening.  No

## 2022-11-28 NOTE — PROGRESS NOTES
[address] [place] [address]   Office [phone], Fax [phone]      Chief Complaint:     Chief Complaint   Patient presents with    Ovarian Cyst     Follow up       Arpit Landon is a 46 y.o. female that presents for: cyst    Maranda  #[id number]  used for this encounter due to language barrier  Subjective:   HPI:  Arpit Landon is a 46 y.o. female that presents for:    Ovarian cyst:  - pain is the same   - Noted on [address] 12/20/21 7.2cm on L  - intermittent pain on LLQ  - 3/10   - bothers her more during her period   - LMP: two months ago   - has not been taking Sprintec  - has been taking injection from [address] [employer] for contraception (Mesigyna - combined injectable q monthly)  - takes this once a month, started 4 months ago  - taking ibuprofen around once a day for pain    ROS:   Review of Systems   Constitutional:  Negative for chills and fever. Gastrointestinal:  Positive for abdominal pain. Past medical history, social history, medications, and allergies personally reviewed. Past Medical History:   Diagnosis Date    Gestational diabetes         Medications:   Current Outpatient Medications   Medication Sig    ibuprofen (MOTRIN) 800 mg tablet Take 0.5 Tablets by mouth every eight (8) hours as needed for Pain. Indications: pain     No current facility-administered medications for this visit. Allergies:  No Known Allergies     Health Maintenance:  Health Maintenance Due   Topic Date Due    COVID-19 Vaccine (1) Never done    Pneumococcal 0-64 years (1 - PCV) Never done    Flu Vaccine (1) 08/01/2022        Objective:   Vitals reviewed. Visit Vitals  /71 (BP 1 Location: Right arm, BP Patient Position: Sitting, BP Cuff Size: Large adult)   Pulse 71   Temp 98.7 °F (37.1 °C) (Oral)   Resp 18   Ht 5' 2\" (1.575 m)   Wt 208 lb (94.3 kg)   LMP 09/29/2022 (Exact Date)   SpO2 96%   BMI 38.04 kg/m²        Physical Exam:  General Alert. No distress. Not diaphoretic.  No jaundice, cyanosis, pallor. HENT Head Normocephalic and atraumatic. Eyes Conjunctivae pink, no discharge. No scleral icterus. EOMI. Cardio Normal rate, regular rhythm. No murmur, gallop, or friction rub. No chest wall tenderness. Pulmonary Effort normal. Breath sounds normal. No respiratory distress. No wheezes, rales, or rhonchi. No Stridor. Abdominal Soft. Bowel sounds normal. No distension. Mild TTP in LLQ.  Deferred. Neurological No focal deficits. Skin Skin is warm and dry. No rash noted. No erythema. Psychiatric Mood, affect, and judgment normal.        Assessment/Plan:     1. Missed menses  UPT negative. Discussed stopping injectable from [employer] [facility]. She will call insurance to ensure she still has coverage and will follow up in 2 weeks to discuss options. Patient counseled on use of back up method of contraception (ie condoms) if not desiring pregnancy. -     AMB POC URINE PREGNANCY TEST, VISUAL COLOR COMPARISON    2. Cyst of left ovary  Given still bothersome >1 year will repeat US to ensure not growing further. Will refer to ob/gyn pending results. -     US TRANSVAGINAL; Future       Follow up:   Return in about 2 weeks (around 12/12/2022) for family planning discussion. Pt was discussed with Dr. Derrick Funk (attending physician). I have reviewed pertinent labs results and other data. I have discussed the diagnosis with the patient and the intended plan as seen in the above orders. The patient has received an after-visit summary and questions were answered concerning future plans. I have discussed medication side effects and warnings with the patient as well.     Arturo Chavira MD  Resident [facility]  11/28/22

## 2022-11-28 NOTE — TELEPHONE ENCOUNTER
Attempted to call pt to discuss her appt 11/28 at 2pm. Pt has already received a pap + labs.  Pt was unavailable to speak

## 2022-12-05 ENCOUNTER — TRANSCRIBE ORDER (OUTPATIENT)
Dept: SCHEDULING | Age: 42
End: 2022-12-05

## 2022-12-05 DIAGNOSIS — N83.202 BILATERAL OVARIAN CYSTS: Primary | ICD-10-CM

## 2022-12-05 DIAGNOSIS — N83.201 BILATERAL OVARIAN CYSTS: Primary | ICD-10-CM

## 2023-04-22 DIAGNOSIS — N83.201 BILATERAL OVARIAN CYSTS: Primary | ICD-10-CM

## 2023-04-22 DIAGNOSIS — N83.202 BILATERAL OVARIAN CYSTS: Primary | ICD-10-CM

## 2023-05-25 RX ORDER — IBUPROFEN 800 MG/1
400 TABLET ORAL EVERY 8 HOURS PRN
COMMUNITY
Start: 2022-10-25

## 2024-09-06 NOTE — PROGRESS NOTES
I reviewed with the resident the medical history and the resident's findings on the physical examination. I discussed with the resident the patient's diagnosis and concur with the plan. 20.9

## (undated) DEVICE — TELFA NON-ADHERENT ABSORBENT DRESSING: Brand: TELFA

## (undated) DEVICE — X-RAY SPONGES,16 PLY: Brand: DERMACEA

## (undated) DEVICE — SOLUTION IV 1000ML 0.9% SOD CHL

## (undated) DEVICE — LIGHT HANDLE: Brand: DEVON

## (undated) DEVICE — TOWEL SURG W17XL27IN STD BLU COT NONFENESTRATED PREWASHED

## (undated) DEVICE — STERILE POLYISOPRENE POWDER-FREE SURGICAL GLOVES: Brand: PROTEXIS

## (undated) DEVICE — HANDLE SUCT TBNG L6FR DIA3/8IN SWVL W/ M ADPT FOR BERK PMP

## (undated) DEVICE — INFECTION CONTROL KIT SYS

## (undated) DEVICE — TRAY PREP DRY W/ PREM GLV 2 APPL 6 SPNG 2 UNDPD 1 OVERWRAP

## (undated) DEVICE — PAD,SANITARY,11 IN,MAXI,N-STRL,IND WRAP: Brand: MEDLINE

## (undated) DEVICE — COLLECTION KT SUC TISS BERK -- GYRUS

## (undated) DEVICE — SKIN MARKER,REGULAR TIP WITH RULER AND LABELS: Brand: DEVON

## (undated) DEVICE — GOWN,SIRUS,NONRNF,SETINSLV,XL,20/CS: Brand: MEDLINE

## (undated) DEVICE — DEVON™ KNEE AND BODY STRAP 60" X 3" (1.5 M X 7.6 CM): Brand: DEVON

## (undated) DEVICE — PREP PAD BNS: Brand: CONVERTORS

## (undated) DEVICE — PACK,LITHOTOMY,PK I: Brand: MEDLINE

## (undated) DEVICE — CATH URETH INTMIT ROB 16FR FUN -- CONVERT TO ITEM 179520